# Patient Record
Sex: FEMALE | Race: OTHER | NOT HISPANIC OR LATINO
[De-identification: names, ages, dates, MRNs, and addresses within clinical notes are randomized per-mention and may not be internally consistent; named-entity substitution may affect disease eponyms.]

---

## 2017-02-10 ENCOUNTER — OTHER (OUTPATIENT)
Age: 57
End: 2017-02-10

## 2017-02-14 ENCOUNTER — OTHER (OUTPATIENT)
Age: 57
End: 2017-02-14

## 2017-04-13 ENCOUNTER — RX RENEWAL (OUTPATIENT)
Age: 57
End: 2017-04-13

## 2017-04-18 ENCOUNTER — APPOINTMENT (OUTPATIENT)
Dept: INTERNAL MEDICINE | Facility: CLINIC | Age: 57
End: 2017-04-18

## 2017-04-18 ENCOUNTER — RX RENEWAL (OUTPATIENT)
Age: 57
End: 2017-04-18

## 2017-04-18 VITALS
HEART RATE: 80 BPM | DIASTOLIC BLOOD PRESSURE: 84 MMHG | HEIGHT: 60 IN | OXYGEN SATURATION: 98 % | BODY MASS INDEX: 33.77 KG/M2 | WEIGHT: 172 LBS | SYSTOLIC BLOOD PRESSURE: 132 MMHG | TEMPERATURE: 99.4 F

## 2017-04-18 DIAGNOSIS — Z87.891 PERSONAL HISTORY OF NICOTINE DEPENDENCE: ICD-10-CM

## 2017-04-18 DIAGNOSIS — Z87.09 PERSONAL HISTORY OF OTHER DISEASES OF THE RESPIRATORY SYSTEM: ICD-10-CM

## 2017-04-18 RX ORDER — MOMETASONE 50 UG/1
50 SPRAY, METERED NASAL TWICE DAILY
Qty: 1 | Refills: 2 | Status: DISCONTINUED | COMMUNITY
Start: 2017-04-18 | End: 2017-04-18

## 2017-04-26 ENCOUNTER — RX RENEWAL (OUTPATIENT)
Age: 57
End: 2017-04-26

## 2017-04-26 RX ORDER — LORATADINE 5 MG/5 ML
10-240 SOLUTION, ORAL ORAL
Qty: 1 | Refills: 0 | Status: DISCONTINUED | COMMUNITY
Start: 2017-04-18 | End: 2017-04-26

## 2017-04-27 ENCOUNTER — RX RENEWAL (OUTPATIENT)
Age: 57
End: 2017-04-27

## 2017-05-08 ENCOUNTER — RX RENEWAL (OUTPATIENT)
Age: 57
End: 2017-05-08

## 2017-05-22 ENCOUNTER — RX RENEWAL (OUTPATIENT)
Age: 57
End: 2017-05-22

## 2017-06-12 ENCOUNTER — RX RENEWAL (OUTPATIENT)
Age: 57
End: 2017-06-12

## 2017-06-27 ENCOUNTER — RX RENEWAL (OUTPATIENT)
Age: 57
End: 2017-06-27

## 2017-08-29 ENCOUNTER — RX RENEWAL (OUTPATIENT)
Age: 57
End: 2017-08-29

## 2017-12-16 ENCOUNTER — RESULT REVIEW (OUTPATIENT)
Age: 57
End: 2017-12-16

## 2018-01-24 ENCOUNTER — RX RENEWAL (OUTPATIENT)
Age: 58
End: 2018-01-24

## 2018-02-15 ENCOUNTER — TRANSCRIPTION ENCOUNTER (OUTPATIENT)
Age: 58
End: 2018-02-15

## 2018-02-15 ENCOUNTER — APPOINTMENT (OUTPATIENT)
Dept: INTERNAL MEDICINE | Facility: CLINIC | Age: 58
End: 2018-02-15

## 2018-02-27 ENCOUNTER — APPOINTMENT (OUTPATIENT)
Dept: ORTHOPEDIC SURGERY | Facility: CLINIC | Age: 58
End: 2018-02-27
Payer: MEDICAID

## 2018-02-27 PROCEDURE — 99214 OFFICE O/P EST MOD 30 MIN: CPT

## 2018-10-08 ENCOUNTER — RX RENEWAL (OUTPATIENT)
Age: 58
End: 2018-10-08

## 2019-02-01 ENCOUNTER — APPOINTMENT (OUTPATIENT)
Dept: INTERNAL MEDICINE | Facility: CLINIC | Age: 59
End: 2019-02-01
Payer: COMMERCIAL

## 2019-02-01 ENCOUNTER — LABORATORY RESULT (OUTPATIENT)
Age: 59
End: 2019-02-01

## 2019-02-01 ENCOUNTER — TRANSCRIPTION ENCOUNTER (OUTPATIENT)
Age: 59
End: 2019-02-01

## 2019-02-01 VITALS
WEIGHT: 166 LBS | DIASTOLIC BLOOD PRESSURE: 82 MMHG | HEART RATE: 82 BPM | OXYGEN SATURATION: 98 % | SYSTOLIC BLOOD PRESSURE: 136 MMHG | TEMPERATURE: 98.5 F | BODY MASS INDEX: 32.42 KG/M2

## 2019-02-01 PROCEDURE — G0008: CPT

## 2019-02-01 PROCEDURE — 99396 PREV VISIT EST AGE 40-64: CPT | Mod: 25

## 2019-02-01 PROCEDURE — 90686 IIV4 VACC NO PRSV 0.5 ML IM: CPT

## 2019-02-01 PROCEDURE — 36415 COLL VENOUS BLD VENIPUNCTURE: CPT

## 2019-02-01 RX ORDER — CHLORHEXIDINE GLUCONATE 4 %
325 (65 FE) LIQUID (ML) TOPICAL
Qty: 90 | Refills: 0 | Status: DISCONTINUED | COMMUNITY
Start: 2017-04-13 | End: 2019-02-01

## 2019-02-01 RX ORDER — LORATADINE 10 MG/1
10 TABLET ORAL
Qty: 60 | Refills: 1 | Status: DISCONTINUED | COMMUNITY
Start: 2017-06-27 | End: 2019-02-01

## 2019-02-01 RX ORDER — FLUTICASONE PROPIONATE 50 UG/1
50 SPRAY, METERED NASAL
Qty: 1 | Refills: 0 | Status: DISCONTINUED | COMMUNITY
Start: 2017-04-18 | End: 2019-02-01

## 2019-02-01 RX ORDER — BENZONATATE 100 MG/1
100 CAPSULE ORAL
Qty: 1 | Refills: 0 | Status: DISCONTINUED | COMMUNITY
Start: 2017-04-18 | End: 2019-02-01

## 2019-02-01 NOTE — ASSESSMENT
[FreeTextEntry1] : 57 y/o female with a hx of idiopthic neutropenia, here for CPE, largely healty\par \par GERD:  advised trial of decrease in PPI dosing/frequency, can move to H2 blocker every day, discussed reisksof long term ppi use.\par \par Neutropenia:  check CBC, despite nuimbers, patient has not demonstrated clinical susceptibility to infection\par -flu shot today\par \par HCM:  flu shot today\par mammo ordered\par reports recent Pap in a few years\par Hep C screening\par \par ABd pain: normal exam, symptoms seem most c/w gas, advised healthy eating\par Hip girdle back pain with prolonged sitting, likely MSK, suggested frequent stretching for now.

## 2019-02-01 NOTE — HISTORY OF PRESENT ILLNESS
[FreeTextEntry1] : CPE [de-identified] : Has been getting shots for plantar fascitis.  \par \par Has some RUQ pain, worse with poor eating, worse with constipation.  \par \par Driving for [employer], sits for long stretches.  Develops a burnign pain in her hip girdle, lower back, which is relieved with standing and stretching.  No association with bowels, improves when she eliminates carbs.  \par \par C/o "pink eye" on left.  spray painted a window on Sunday, was wearing glasses.  by tuesday, eye was burning.  some blurriness had oain in eye, not worse with movement.  no drainage, no crusting.  symptoms largely unchanged.  took tylenol with relief (3 tabs), and saline drops with some relief.\par \par Finger joints are great.  knees still bother her.  sometimes takes an NSAID< which helps.  \par \par Feeling well otherwise, no infections.  \par \par Due for mammo. declines flu shot.

## 2019-02-01 NOTE — PHYSICAL EXAM
[No Acute Distress] : no acute distress [Well-Appearing] : well-appearing [Normal Sclera/Conjunctiva] : normal sclera/conjunctiva [Normal Oropharynx] : the oropharynx was normal [Normal TMs] : both tympanic membranes were normal [Supple] : supple [No Lymphadenopathy] : no lymphadenopathy [No Respiratory Distress] : no respiratory distress  [Clear to Auscultation] : lungs were clear to auscultation bilaterally [Normal Rate] : normal rate  [Regular Rhythm] : with a regular rhythm [Normal S1, S2] : normal S1 and S2 [No Murmur] : no murmur heard [No Edema] : there was no peripheral edema [Normal Appearance] : normal in appearance [No Nipple Discharge] : no nipple discharge [Soft] : abdomen soft [Non Tender] : non-tender [No Masses] : no abdominal mass palpated [No HSM] : no HSM [Normal Supraclavicular Nodes] : no supraclavicular lymphadenopathy [Normal Axillary Nodes] : no axillary lymphadenopathy [Normal Posterior Cervical Nodes] : no posterior cervical lymphadenopathy [Normal Anterior Cervical Nodes] : no anterior cervical lymphadenopathy [No Rash] : no rash [No Skin Lesions] : no skin lesions [Normal Mood] : the mood was normal [Normal Insight/Judgement] : insight and judgment were intact

## 2019-02-04 LAB
ALBUMIN SERPL ELPH-MCNC: 4.7 G/DL
ALP BLD-CCNC: 74 U/L
ALT SERPL-CCNC: 14 U/L
ANION GAP SERPL CALC-SCNC: 13 MMOL/L
AST SERPL-CCNC: 17 U/L
BASOPHILS # BLD AUTO: 0.07 K/UL
BASOPHILS NFR BLD AUTO: 2.6 %
BILIRUB SERPL-MCNC: 0.4 MG/DL
BUN SERPL-MCNC: 9 MG/DL
CALCIUM SERPL-MCNC: 10 MG/DL
CHLORIDE SERPL-SCNC: 103 MMOL/L
CHOLEST SERPL-MCNC: 187 MG/DL
CHOLEST/HDLC SERPL: 3 RATIO
CO2 SERPL-SCNC: 26 MMOL/L
CREAT SERPL-MCNC: 0.64 MG/DL
EOSINOPHIL # BLD AUTO: 0.11 K/UL
EOSINOPHIL NFR BLD AUTO: 4.4 %
GLUCOSE SERPL-MCNC: 89 MG/DL
HBA1C MFR BLD HPLC: 4.7 %
HCT VFR BLD CALC: 43.6 %
HCV AB SER QL: NONREACTIVE
HCV S/CO RATIO: 0.15 S/CO
HDLC SERPL-MCNC: 62 MG/DL
HGB BLD-MCNC: 14.6 G/DL
LDLC SERPL CALC-MCNC: 104 MG/DL
LYMPHOCYTES # BLD AUTO: 0.8 K/UL
LYMPHOCYTES NFR BLD AUTO: 31.6 %
MAN DIFF?: NORMAL
MCHC RBC-ENTMCNC: 29.6 PG
MCHC RBC-ENTMCNC: 33.5 GM/DL
MCV RBC AUTO: 88.4 FL
MONOCYTES # BLD AUTO: 0.35 K/UL
MONOCYTES NFR BLD AUTO: 14 %
NEUTROPHILS # BLD AUTO: 1.04 K/UL
NEUTROPHILS NFR BLD AUTO: 33.4 %
PLATELET # BLD AUTO: 317 K/UL
POTASSIUM SERPL-SCNC: 4.3 MMOL/L
PROT SERPL-MCNC: 7.7 G/DL
RBC # BLD: 4.93 M/UL
RBC # FLD: 13.7 %
SODIUM SERPL-SCNC: 142 MMOL/L
TRIGL SERPL-MCNC: 107 MG/DL
WBC # FLD AUTO: 2.52 K/UL

## 2019-03-04 ENCOUNTER — RX RENEWAL (OUTPATIENT)
Age: 59
End: 2019-03-04

## 2019-03-19 ENCOUNTER — OTHER (OUTPATIENT)
Age: 59
End: 2019-03-19

## 2019-05-08 ENCOUNTER — APPOINTMENT (OUTPATIENT)
Dept: INTERNAL MEDICINE | Facility: CLINIC | Age: 59
End: 2019-05-08

## 2019-05-10 ENCOUNTER — RESULT REVIEW (OUTPATIENT)
Age: 59
End: 2019-05-10

## 2019-05-13 ENCOUNTER — APPOINTMENT (OUTPATIENT)
Dept: INTERNAL MEDICINE | Facility: CLINIC | Age: 59
End: 2019-05-13
Payer: COMMERCIAL

## 2019-05-13 VITALS
HEART RATE: 80 BPM | TEMPERATURE: 98.3 F | BODY MASS INDEX: 30.74 KG/M2 | SYSTOLIC BLOOD PRESSURE: 130 MMHG | HEIGHT: 60 IN | OXYGEN SATURATION: 98 % | DIASTOLIC BLOOD PRESSURE: 76 MMHG | WEIGHT: 156.6 LBS

## 2019-05-13 DIAGNOSIS — R21 RASH AND OTHER NONSPECIFIC SKIN ERUPTION: ICD-10-CM

## 2019-05-13 PROCEDURE — 99214 OFFICE O/P EST MOD 30 MIN: CPT

## 2019-05-13 NOTE — REVIEW OF SYSTEMS
[Recent Change In Weight] : ~T recent weight change [Constipation] : constipation [Nausea] : no nausea [Abdominal Pain] : abdominal pain [Diarrhea] : diarrhea [Vomiting] : no vomiting [Heartburn] : no heartburn [Melena] : no melena [Negative] : Musculoskeletal [FreeTextEntry2] : lost 10lbs with effort

## 2019-05-13 NOTE — ASSESSMENT
[FreeTextEntry1] : Lower abdominal pain/lower back pain, occurs while driving for prolonger periods, resolves with standing/walking and with bowel movements.  Has improved with improvement in her chronic constipation.  Suspect gas pains. exacerbated by prolonged sitting (uber ), given resolution.  will monitor for now (especially while she is vacationing and won't be driving).  If symptoms have not resolved, will pursue imaging,\par \par Given hx of neutropenia, patient provided with a rx for levaquin for travel.

## 2019-05-13 NOTE — HISTORY OF PRESENT ILLNESS
[de-identified] : Lower back pain and lower abdominal pain, burning/inflammation in nature, worse with constipation, does not hurt to touch, pain goes away with movement, resolves when she moves her bowels.  Happens everyday, usually occurs after driving for a while, hurts the most while driving. goes away at the end of the day when she stops driving. and during sleep, but it will recur the next day if she starts driving again.  Pain is severe enough to give her a headache.  Started using prunes (this week) and senekot (2 weeks ago) to help with her bowels.  Going to the bathroom and now it is not little rocks (has a hx of chronic constipation). Improvement in BM has decreased her pain.  Can also occur after meals, feels uncomfortable.  \par \par No fever, no n/v/d, no urinary symptoms, pain does not radiate, she does not have weekness, numbness or tingling in her legs. \par \par Going to [place] for 7 weeks.

## 2019-05-13 NOTE — PHYSICAL EXAM
[No Acute Distress] : no acute distress [Well-Appearing] : well-appearing [Normal Sclera/Conjunctiva] : normal sclera/conjunctiva [Normal Oropharynx] : the oropharynx was normal [Supple] : supple [No Respiratory Distress] : no respiratory distress  [Clear to Auscultation] : lungs were clear to auscultation bilaterally [Normal Rate] : normal rate  [Regular Rhythm] : with a regular rhythm [Normal S1, S2] : normal S1 and S2 [No Edema] : there was no peripheral edema [Soft] : abdomen soft [Non Tender] : non-tender [Normal Bowel Sounds] : normal bowel sounds [No Masses] : no abdominal mass palpated [Normal Anterior Cervical Nodes] : no anterior cervical lymphadenopathy [Normal Supraclavicular Nodes] : no supraclavicular lymphadenopathy

## 2019-06-04 ENCOUNTER — TRANSCRIPTION ENCOUNTER (OUTPATIENT)
Age: 59
End: 2019-06-04

## 2019-08-01 ENCOUNTER — TRANSCRIPTION ENCOUNTER (OUTPATIENT)
Age: 59
End: 2019-08-01

## 2019-08-01 ENCOUNTER — OTHER (OUTPATIENT)
Age: 59
End: 2019-08-01

## 2019-08-02 ENCOUNTER — OUTPATIENT (OUTPATIENT)
Dept: OUTPATIENT SERVICES | Facility: HOSPITAL | Age: 59
LOS: 1 days | Discharge: HOME | End: 2019-08-02

## 2019-08-02 DIAGNOSIS — D72.819 DECREASED WHITE BLOOD CELL COUNT, UNSPECIFIED: ICD-10-CM

## 2019-08-05 ENCOUNTER — RESULT REVIEW (OUTPATIENT)
Age: 59
End: 2019-08-05

## 2019-08-05 ENCOUNTER — APPOINTMENT (OUTPATIENT)
Dept: HEMATOLOGY ONCOLOGY | Facility: CLINIC | Age: 59
End: 2019-08-05
Payer: COMMERCIAL

## 2019-08-05 ENCOUNTER — APPOINTMENT (OUTPATIENT)
Dept: INFUSION THERAPY | Facility: HOSPITAL | Age: 59
End: 2019-08-05

## 2019-08-05 VITALS
WEIGHT: 161.69 LBS | RESPIRATION RATE: 18 BRPM | BODY MASS INDEX: 31.58 KG/M2 | DIASTOLIC BLOOD PRESSURE: 84 MMHG | OXYGEN SATURATION: 100 % | TEMPERATURE: 98.9 F | SYSTOLIC BLOOD PRESSURE: 126 MMHG | HEART RATE: 68 BPM

## 2019-08-05 LAB
EOSINOPHIL NFR BLD AUTO: 7 % — HIGH (ref 0–6)
HCT VFR BLD CALC: 41.3 % — SIGNIFICANT CHANGE UP (ref 34.5–45)
HGB BLD-MCNC: 14.2 G/DL — SIGNIFICANT CHANGE UP (ref 11.5–15.5)
LYMPHOCYTES # BLD AUTO: 46 % — HIGH (ref 13–44)
LYMPHOCYTES # BLD AUTO: SIGNIFICANT CHANGE UP K/UL (ref 1–3.3)
MCHC RBC-ENTMCNC: 30.8 PG — SIGNIFICANT CHANGE UP (ref 27–34)
MCHC RBC-ENTMCNC: 34.5 G/DL — SIGNIFICANT CHANGE UP (ref 32–36)
MCV RBC AUTO: 89.2 FL — SIGNIFICANT CHANGE UP (ref 80–100)
MONOCYTES NFR BLD AUTO: 14 % — SIGNIFICANT CHANGE UP (ref 2–14)
NEUTROPHILS # BLD AUTO: 0.4 K/UL — LOW (ref 1.8–7.4)
NEUTROPHILS NFR BLD AUTO: 33 % — LOW (ref 43–77)
PLAT MORPH BLD: NORMAL — SIGNIFICANT CHANGE UP
PLATELET # BLD AUTO: 328 K/UL — SIGNIFICANT CHANGE UP (ref 150–400)
RBC # BLD: 4.63 M/UL — SIGNIFICANT CHANGE UP (ref 3.8–5.2)
RBC # FLD: 12.7 % — SIGNIFICANT CHANGE UP (ref 10.3–14.5)
RBC BLD AUTO: SIGNIFICANT CHANGE UP
WBC # BLD: 2.1 K/UL — LOW (ref 3.8–10.5)
WBC # FLD AUTO: 2.1 K/UL — LOW (ref 3.8–10.5)

## 2019-08-05 PROCEDURE — 99214 OFFICE O/P EST MOD 30 MIN: CPT

## 2019-08-05 RX ORDER — LEVOFLOXACIN 500 MG/1
500 TABLET, FILM COATED ORAL DAILY
Qty: 14 | Refills: 0 | Status: DISCONTINUED | COMMUNITY
Start: 2019-05-13 | End: 2019-08-05

## 2019-08-06 ENCOUNTER — RESULT REVIEW (OUTPATIENT)
Age: 59
End: 2019-08-06

## 2019-08-06 ENCOUNTER — APPOINTMENT (OUTPATIENT)
Dept: INFUSION THERAPY | Facility: HOSPITAL | Age: 59
End: 2019-08-06

## 2019-08-06 DIAGNOSIS — Z51.89 ENCOUNTER FOR OTHER SPECIFIED AFTERCARE: ICD-10-CM

## 2019-08-06 LAB
BASOPHILS # BLD AUTO: 0.1 K/UL — SIGNIFICANT CHANGE UP (ref 0–0.2)
BASOPHILS NFR BLD AUTO: 1 % — SIGNIFICANT CHANGE UP (ref 0–2)
EOSINOPHIL # BLD AUTO: 0.3 K/UL — SIGNIFICANT CHANGE UP (ref 0–0.5)
EOSINOPHIL NFR BLD AUTO: 3.8 % — SIGNIFICANT CHANGE UP (ref 0–6)
HCT VFR BLD CALC: 42 % — SIGNIFICANT CHANGE UP (ref 34.5–45)
HGB BLD-MCNC: 14.7 G/DL — SIGNIFICANT CHANGE UP (ref 11.5–15.5)
LYMPHOCYTES # BLD AUTO: 1 K/UL — SIGNIFICANT CHANGE UP (ref 1–3.3)
LYMPHOCYTES # BLD AUTO: 14.9 % — SIGNIFICANT CHANGE UP (ref 13–44)
MCHC RBC-ENTMCNC: 31.3 PG — SIGNIFICANT CHANGE UP (ref 27–34)
MCHC RBC-ENTMCNC: 35.1 G/DL — SIGNIFICANT CHANGE UP (ref 32–36)
MCV RBC AUTO: 89.2 FL — SIGNIFICANT CHANGE UP (ref 80–100)
MONOCYTES # BLD AUTO: 0.8 K/UL — SIGNIFICANT CHANGE UP (ref 0–0.9)
MONOCYTES NFR BLD AUTO: 12.4 % — SIGNIFICANT CHANGE UP (ref 2–14)
NEUTROPHILS # BLD AUTO: 4.5 K/UL — SIGNIFICANT CHANGE UP (ref 1.8–7.4)
NEUTROPHILS NFR BLD AUTO: 67.9 % — SIGNIFICANT CHANGE UP (ref 43–77)
PLATELET # BLD AUTO: 271 K/UL — SIGNIFICANT CHANGE UP (ref 150–400)
RBC # BLD: 4.71 M/UL — SIGNIFICANT CHANGE UP (ref 3.8–5.2)
RBC # FLD: 12.2 % — SIGNIFICANT CHANGE UP (ref 10.3–14.5)
WBC # BLD: 6.7 K/UL — SIGNIFICANT CHANGE UP (ref 3.8–10.5)
WBC # FLD AUTO: 6.7 K/UL — SIGNIFICANT CHANGE UP (ref 3.8–10.5)

## 2019-08-08 NOTE — ASSESSMENT
[FreeTextEntry1] : 56 year old woman with idiopathic neutropenia.  She has had neutropenia for many years, followed by multiple hematologists in the past.  \par She has had a bone marrow biopsy in Feb 2015 that was unrevealing, normal myeloid lineage.  She has no significant signs or features of a congenital neutropenia or chronic infections. \par Her viral studies- HIV/hepatitis are negative, normal copper/zinc levels, negative rheumatologic panel except for a minimally high GUMARO, normal immunoglobulins, TSH.\par Her neutropenia is likely autoimmune/idiopathic.  \par As she does not have increased amount of infections or fevers, would not offer any g-csf or prophylactic antibiotics- risks and benefits discussed.\par  If she has any fever, she is instructed that she needs to be seen.  Prior to any procedure/surgery, would offer her g-csf to minimize any infectious complication/healing. \par Pt is having surgery for bilateral bunions on 8/7/19.\par  today,, pt will get Zarxio today and tomorrow in preparation for surgery.\par She will follow up as needed.\par Follow up in 6 months.

## 2019-08-08 NOTE — HISTORY OF PRESENT ILLNESS
[de-identified] : Patient is here for a follow up after having her bloods checked at her PCP's office, noted to have a WBC count of 1.8.  She is feeling well, no significant complaints, she has no fever/chills, no recent infections, no chest pain, no SOB, no abdominal c/o.  She has had intermittent hot flashes/sweats due to menopause.  \par She was recently evaluated by rheumatology for joint aches, diagnosed with osteoarthritis and was started on Mobic.  Most of her discomfort is in her 3rd, 4th and 5th fingers.\par \par Pt is going for bunion surgery on both feet on 8/7/19.      [de-identified] : Idiopathic leukopenia/neutropenia

## 2019-08-08 NOTE — PHYSICAL EXAM
[Fully active, able to carry on all pre-disease performance without restriction] : Status 0 - Fully active, able to carry on all pre-disease performance without restriction [de-identified] : No cervical, axillary or inguinal LAD noted.  [Normal] : affect appropriate

## 2019-08-22 ENCOUNTER — APPOINTMENT (OUTPATIENT)
Dept: HEMATOLOGY ONCOLOGY | Facility: CLINIC | Age: 59
End: 2019-08-22

## 2019-08-23 ENCOUNTER — APPOINTMENT (OUTPATIENT)
Dept: HEMATOLOGY ONCOLOGY | Facility: CLINIC | Age: 59
End: 2019-08-23

## 2019-08-23 ENCOUNTER — RESULT REVIEW (OUTPATIENT)
Age: 59
End: 2019-08-23

## 2019-08-23 LAB
EOSINOPHIL # BLD AUTO: 0.2 K/UL — SIGNIFICANT CHANGE UP (ref 0–0.5)
EOSINOPHIL NFR BLD AUTO: 4 % — SIGNIFICANT CHANGE UP (ref 0–6)
HCT VFR BLD CALC: 42.6 % — SIGNIFICANT CHANGE UP (ref 34.5–45)
HGB BLD-MCNC: 14.7 G/DL — SIGNIFICANT CHANGE UP (ref 11.5–15.5)
LYMPHOCYTES # BLD AUTO: 0.9 K/UL — LOW (ref 1–3.3)
LYMPHOCYTES # BLD AUTO: 59 % — HIGH (ref 13–44)
MCHC RBC-ENTMCNC: 30.7 PG — SIGNIFICANT CHANGE UP (ref 27–34)
MCHC RBC-ENTMCNC: 34.6 G/DL — SIGNIFICANT CHANGE UP (ref 32–36)
MCV RBC AUTO: 88.8 FL — SIGNIFICANT CHANGE UP (ref 80–100)
MONOCYTES # BLD AUTO: 0.4 K/UL — SIGNIFICANT CHANGE UP (ref 0–0.9)
MONOCYTES NFR BLD AUTO: 16 % — HIGH (ref 2–14)
NEUTROPHILS # BLD AUTO: 0.3 K/UL — LOW (ref 1.8–7.4)
NEUTROPHILS NFR BLD AUTO: 21 % — LOW (ref 43–77)
PLAT MORPH BLD: NORMAL — SIGNIFICANT CHANGE UP
PLATELET # BLD AUTO: 377 K/UL — SIGNIFICANT CHANGE UP (ref 150–400)
RBC # BLD: 4.79 M/UL — SIGNIFICANT CHANGE UP (ref 3.8–5.2)
RBC # FLD: 12.9 % — SIGNIFICANT CHANGE UP (ref 10.3–14.5)
RBC BLD AUTO: SIGNIFICANT CHANGE UP
WBC # BLD: 1.8 K/UL — LOW (ref 3.8–10.5)
WBC # FLD AUTO: 1.8 K/UL — LOW (ref 3.8–10.5)

## 2019-09-05 ENCOUNTER — TRANSCRIPTION ENCOUNTER (OUTPATIENT)
Age: 59
End: 2019-09-05

## 2020-01-27 ENCOUNTER — RX RENEWAL (OUTPATIENT)
Age: 60
End: 2020-01-27

## 2020-04-06 ENCOUNTER — APPOINTMENT (OUTPATIENT)
Dept: INTERNAL MEDICINE | Facility: CLINIC | Age: 60
End: 2020-04-06

## 2020-04-20 ENCOUNTER — RECORD ABSTRACTING (OUTPATIENT)
Age: 60
End: 2020-04-20

## 2020-07-13 ENCOUNTER — RX RENEWAL (OUTPATIENT)
Age: 60
End: 2020-07-13

## 2020-10-12 ENCOUNTER — LABORATORY RESULT (OUTPATIENT)
Age: 60
End: 2020-10-12

## 2020-10-12 ENCOUNTER — APPOINTMENT (OUTPATIENT)
Dept: INTERNAL MEDICINE | Facility: CLINIC | Age: 60
End: 2020-10-12
Payer: COMMERCIAL

## 2020-10-12 VITALS
SYSTOLIC BLOOD PRESSURE: 130 MMHG | TEMPERATURE: 97.5 F | OXYGEN SATURATION: 98 % | WEIGHT: 168.5 LBS | DIASTOLIC BLOOD PRESSURE: 80 MMHG | HEIGHT: 60 IN | BODY MASS INDEX: 33.08 KG/M2 | HEART RATE: 83 BPM

## 2020-10-12 DIAGNOSIS — S80.02XA CONTUSION OF LEFT KNEE, INITIAL ENCOUNTER: ICD-10-CM

## 2020-10-12 DIAGNOSIS — G89.29 PAIN IN UNSPECIFIED KNEE: ICD-10-CM

## 2020-10-12 DIAGNOSIS — M25.569 PAIN IN UNSPECIFIED KNEE: ICD-10-CM

## 2020-10-12 DIAGNOSIS — M19.049 PRIMARY OSTEOARTHRITIS, UNSPECIFIED HAND: ICD-10-CM

## 2020-10-12 DIAGNOSIS — Z87.42 PERSONAL HISTORY OF OTHER DISEASES OF THE FEMALE GENITAL TRACT: ICD-10-CM

## 2020-10-12 DIAGNOSIS — Z86.2 PERSONAL HISTORY OF DISEASES OF THE BLOOD AND BLOOD-FORMING ORGANS AND CERTAIN DISORDERS INVOLVING THE IMMUNE MECHANISM: ICD-10-CM

## 2020-10-12 DIAGNOSIS — S80.01XA CONTUSION OF RIGHT KNEE, INITIAL ENCOUNTER: ICD-10-CM

## 2020-10-12 PROCEDURE — 36415 COLL VENOUS BLD VENIPUNCTURE: CPT

## 2020-10-12 PROCEDURE — 99396 PREV VISIT EST AGE 40-64: CPT | Mod: 25

## 2020-10-12 NOTE — PHYSICAL EXAM
[No Acute Distress] : no acute distress [Well-Appearing] : well-appearing [Normal Sclera/Conjunctiva] : normal sclera/conjunctiva [Normal Outer Ear/Nose] : the outer ears and nose were normal in appearance [No Lymphadenopathy] : no lymphadenopathy [No Respiratory Distress] : no respiratory distress  [Clear to Auscultation] : lungs were clear to auscultation bilaterally [Normal Rate] : normal rate  [Regular Rhythm] : with a regular rhythm [Normal S1, S2] : normal S1 and S2 [No Edema] : there was no peripheral edema [Soft] : abdomen soft [Non Tender] : non-tender [Normal Supraclavicular Nodes] : no supraclavicular lymphadenopathy [Normal Anterior Cervical Nodes] : no anterior cervical lymphadenopathy [No Rash] : no rash [de-identified] : R TM normal, L TM with some injection, no drainage, no pain at pinna

## 2020-10-12 NOTE — ASSESSMENT
[FreeTextEntry1] : 59 y/o female for f/u\par \par Neutropenia:  CHeck CBC today\par \par GERD: PPI\par \par OA: mobic prn\par \par left ear fullness: advised neti pot + flonase, requests ENT eval\par \par HCM:\par FLu shot done\par DUe for mammo\par Due for colonoscopy\par Labs ordered\par \par COVID AB testing

## 2020-10-12 NOTE — HISTORY OF PRESENT ILLNESS
[FreeTextEntry1] : CPE [de-identified] : 61 y/o female here for f/u, last visit here May 2019.  hx of neutropenia without increased infections, not requiring treatment\par \par HCM:\par Flu shot - had 2 weeks ago.\par Breast Cancer 16 Feb 2017 *s Mammography   OVERDUE\par Colon Cancer 16 Jan 2015 Colonoscopy Screening: No malignant lesions REPEAT DUE\par Last labs 2019\par \par Notes some pressuers in her left ear, no tinnitus or change in hearing, has tried sudafed with no improvement, has used flonase or allergy tablets intermittently.  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

## 2020-10-13 LAB
ALBUMIN SERPL ELPH-MCNC: 4.8 G/DL
ALP BLD-CCNC: 106 U/L
ALT SERPL-CCNC: 25 U/L
ANION GAP SERPL CALC-SCNC: 12 MMOL/L
AST SERPL-CCNC: 20 U/L
BASOPHILS # BLD AUTO: 0.05 K/UL
BASOPHILS NFR BLD AUTO: 1.8 %
BILIRUB SERPL-MCNC: 0.7 MG/DL
BUN SERPL-MCNC: 16 MG/DL
CALCIUM SERPL-MCNC: 10.1 MG/DL
CHLORIDE SERPL-SCNC: 104 MMOL/L
CHOLEST SERPL-MCNC: 215 MG/DL
CHOLEST/HDLC SERPL: 3 RATIO
CO2 SERPL-SCNC: 26 MMOL/L
CREAT SERPL-MCNC: 0.6 MG/DL
EOSINOPHIL # BLD AUTO: 0.14 K/UL
EOSINOPHIL NFR BLD AUTO: 5.4 %
ESTIMATED AVERAGE GLUCOSE: 100 MG/DL
GLUCOSE SERPL-MCNC: 90 MG/DL
HBA1C MFR BLD HPLC: 5.1 %
HCT VFR BLD CALC: 42.4 %
HDLC SERPL-MCNC: 72 MG/DL
HGB BLD-MCNC: 13.9 G/DL
LDLC SERPL CALC-MCNC: 124 MG/DL
LYMPHOCYTES # BLD AUTO: 1.35 K/UL
LYMPHOCYTES NFR BLD AUTO: 53.2 %
MAN DIFF?: NORMAL
MCHC RBC-ENTMCNC: 29 PG
MCHC RBC-ENTMCNC: 32.8 GM/DL
MCV RBC AUTO: 88.3 FL
MONOCYTES # BLD AUTO: 0.3 K/UL
MONOCYTES NFR BLD AUTO: 11.7 %
NEUTROPHILS # BLD AUTO: 0.59 K/UL
NEUTROPHILS NFR BLD AUTO: 18 %
PLATELET # BLD AUTO: 350 K/UL
POTASSIUM SERPL-SCNC: 4.3 MMOL/L
PROT SERPL-MCNC: 7.9 G/DL
RBC # BLD: 4.8 M/UL
RBC # FLD: 13.3 %
SARS-COV-2 IGG SERPL IA-ACNC: 0.1 INDEX
SARS-COV-2 IGG SERPL QL IA: NEGATIVE
SODIUM SERPL-SCNC: 141 MMOL/L
TRIGL SERPL-MCNC: 95 MG/DL
TSH SERPL-ACNC: 3.02 UIU/ML
WBC # FLD AUTO: 2.54 K/UL

## 2020-11-11 ENCOUNTER — APPOINTMENT (OUTPATIENT)
Dept: GASTROENTEROLOGY | Facility: CLINIC | Age: 60
End: 2020-11-11
Payer: COMMERCIAL

## 2020-11-11 VITALS
BODY MASS INDEX: 32.98 KG/M2 | SYSTOLIC BLOOD PRESSURE: 148 MMHG | HEART RATE: 88 BPM | HEIGHT: 60 IN | DIASTOLIC BLOOD PRESSURE: 90 MMHG | TEMPERATURE: 97.2 F | WEIGHT: 168 LBS

## 2020-11-11 PROCEDURE — 99072 ADDL SUPL MATRL&STAF TM PHE: CPT

## 2020-11-11 PROCEDURE — 99204 OFFICE O/P NEW MOD 45 MIN: CPT

## 2020-11-11 RX ORDER — HYDROCORTISONE 25 MG/G
2.5 CREAM TOPICAL TWICE DAILY
Qty: 1 | Refills: 0 | Status: DISCONTINUED | COMMUNITY
Start: 2019-05-13 | End: 2020-11-11

## 2020-11-11 RX ORDER — OMEPRAZOLE 20 MG/1
20 CAPSULE, DELAYED RELEASE ORAL DAILY
Qty: 90 | Refills: 3 | Status: DISCONTINUED | COMMUNITY
Start: 2019-03-19 | End: 2020-11-11

## 2020-11-11 NOTE — HISTORY OF PRESENT ILLNESS
[Heartburn] : heartburn worsened [Nausea] : denies nausea [Vomiting] : denies vomiting [Diarrhea] : denies diarrhea [Constipation] : constipation worsened [Yellow Skin Or Eyes (Jaundice)] : denies jaundice [Abdominal Pain] : abdominal pain worsened [Abdominal Swelling] : denies abdominal swelling [Rectal Pain] : denies rectal pain [_________] : Performed [unfilled] [de-identified] : Megan presents to the office today for evaluation of heartburn, constipation, and lower abdominal pain.  She was last seen by Dr. Parrish in 2016.\par \par The patient reports chronic heartburn and has been taking pantoprazole 40 mg for more than 10 years.  In the past, she has used it twice a day.  Recently, she reports worsening reflux  over the past 6 months.  She denies any dietary or medication changes.  She experiences breakthrough heartburn after eating certain meals.  She denies any dysphagia or nausea.\par \par The patient also notes a change in her bowel movements and reports that she has been having increasing constipation.  She moves her bowels daily, but she sometimes experiences stretches of scybalous stools.  When she is constipated, she also notes bilateral lower abdominal tenderness.  She denies any straining or blood in the stool.  Her weight has been stable.  \par \par On her EGD in 2015, reflux esophagitis and fundic polyps were seen.  On her colonoscopy in 2015, only hemorrhoids were seen. [de-identified] : reflux esophagitis, fundic polyps [de-identified] : hemorrhoids

## 2020-11-11 NOTE — REVIEW OF SYSTEMS
[Abdominal Pain] : abdominal pain [Constipation] : constipation [Joint Pain] : joint pain [Skin Lesions] : skin lesion [Negative] : Heme/Lymph [FreeTextEntry7] : lower abdominal pain [FreeTextEntry9] : generalized [de-identified] : left anterior wrist/ burn self cooking

## 2020-11-11 NOTE — REASON FOR VISIT
[Consultation] : a consultation visit [FreeTextEntry1] : heartburn, constipation, lower abdominal pain

## 2020-11-11 NOTE — ASSESSMENT
[FreeTextEntry1] : 1.  Worsening GERD on PPI.  EGD in January 2015 with reflux esophagitis and fundic polyps.\par 2.  Constipation with lower abdominal pain.  Colonoscopy in January 2015 with internal hemorrhoids.\par 3.  Obesity.\par 4.  History of iron deficiency anemia- secondary to menorrhagia.\par \par Recs:\par - Recent labs reviewed.\par - Prior EGD and colonoscopy results reviewed.\par - Patient was counseled on GERD lifestyle modifications including head of bed elevation at night, avoiding lying down 2-3 hours after eating, weight loss, avoiding tight-fitting clothing, eating small, frequent meals, and minimizing potential food triggers (caffeine, spicy foods, citrus foods, chocolate, mint, alcohol).\par - Continue PPI in AM.  Can use famotidine in evening for breakthrough heartburn.\par - The patient was advised to increase fluid and dietary fiber intake.  In addition, the patient was advised to use a fiber supplement daily and Miralax, Senna/Colace, or milk of magnesia as needed for severe constipation.\par - Patient was advised to undergo endoscopy and colonoscopy- procedure, risks, benefits, and alternatives were explained. Patient agreeable. Brochure given. Golytely prep.

## 2020-11-20 ENCOUNTER — APPOINTMENT (OUTPATIENT)
Dept: ULTRASOUND IMAGING | Facility: HOSPITAL | Age: 60
End: 2020-11-20
Payer: COMMERCIAL

## 2020-11-20 ENCOUNTER — APPOINTMENT (OUTPATIENT)
Dept: MAMMOGRAPHY | Facility: HOSPITAL | Age: 60
End: 2020-11-20
Payer: COMMERCIAL

## 2020-11-20 ENCOUNTER — OUTPATIENT (OUTPATIENT)
Dept: OUTPATIENT SERVICES | Facility: HOSPITAL | Age: 60
LOS: 1 days | End: 2020-11-20
Payer: COMMERCIAL

## 2020-11-20 DIAGNOSIS — Z00.8 ENCOUNTER FOR OTHER GENERAL EXAMINATION: ICD-10-CM

## 2020-11-20 PROCEDURE — 76641 ULTRASOUND BREAST COMPLETE: CPT | Mod: 26,50

## 2020-11-20 PROCEDURE — 77063 BREAST TOMOSYNTHESIS BI: CPT

## 2020-11-20 PROCEDURE — 77067 SCR MAMMO BI INCL CAD: CPT | Mod: 26

## 2020-11-20 PROCEDURE — 76641 ULTRASOUND BREAST COMPLETE: CPT

## 2020-11-20 PROCEDURE — 77063 BREAST TOMOSYNTHESIS BI: CPT | Mod: 26

## 2020-11-20 PROCEDURE — 77067 SCR MAMMO BI INCL CAD: CPT

## 2020-11-23 RX ORDER — POLYETHYLENE GLYCOL-3350 AND ELECTROLYTES WITH FLAVOR PACK 240; 5.84; 2.98; 6.72; 22.72 G/278.26G; G/278.26G; G/278.26G; G/278.26G; G/278.26G
240 POWDER, FOR SOLUTION ORAL
Qty: 1 | Refills: 0 | Status: DISCONTINUED | COMMUNITY
Start: 2020-11-12 | End: 2020-11-23

## 2020-12-01 ENCOUNTER — APPOINTMENT (OUTPATIENT)
Dept: DISASTER EMERGENCY | Facility: CLINIC | Age: 60
End: 2020-12-01

## 2020-12-02 LAB — SARS-COV-2 N GENE NPH QL NAA+PROBE: NOT DETECTED

## 2020-12-04 ENCOUNTER — APPOINTMENT (OUTPATIENT)
Dept: GASTROENTEROLOGY | Facility: CLINIC | Age: 60
End: 2020-12-04
Payer: COMMERCIAL

## 2020-12-04 ENCOUNTER — LABORATORY RESULT (OUTPATIENT)
Age: 60
End: 2020-12-04

## 2020-12-04 PROCEDURE — 43239 EGD BIOPSY SINGLE/MULTIPLE: CPT | Mod: 59

## 2020-12-04 PROCEDURE — 99072 ADDL SUPL MATRL&STAF TM PHE: CPT

## 2020-12-04 PROCEDURE — 45378 DIAGNOSTIC COLONOSCOPY: CPT

## 2020-12-15 PROBLEM — Z87.09 HISTORY OF ACUTE SINUSITIS: Status: RESOLVED | Noted: 2017-04-18 | Resolved: 2020-12-15

## 2021-02-22 ENCOUNTER — NON-APPOINTMENT (OUTPATIENT)
Age: 61
End: 2021-02-22

## 2021-09-08 ENCOUNTER — NON-APPOINTMENT (OUTPATIENT)
Age: 61
End: 2021-09-08

## 2021-09-09 ENCOUNTER — NON-APPOINTMENT (OUTPATIENT)
Age: 61
End: 2021-09-09

## 2022-05-19 ENCOUNTER — APPOINTMENT (OUTPATIENT)
Dept: INTERNAL MEDICINE | Facility: CLINIC | Age: 62
End: 2022-05-19
Payer: COMMERCIAL

## 2022-05-19 ENCOUNTER — LABORATORY RESULT (OUTPATIENT)
Age: 62
End: 2022-05-19

## 2022-05-19 ENCOUNTER — NON-APPOINTMENT (OUTPATIENT)
Age: 62
End: 2022-05-19

## 2022-05-19 VITALS
WEIGHT: 175 LBS | OXYGEN SATURATION: 98 % | BODY MASS INDEX: 34.36 KG/M2 | SYSTOLIC BLOOD PRESSURE: 136 MMHG | HEIGHT: 60 IN | DIASTOLIC BLOOD PRESSURE: 84 MMHG | TEMPERATURE: 98.2 F | HEART RATE: 79 BPM

## 2022-05-19 PROCEDURE — 36415 COLL VENOUS BLD VENIPUNCTURE: CPT

## 2022-05-19 PROCEDURE — 99396 PREV VISIT EST AGE 40-64: CPT | Mod: 25

## 2022-05-19 RX ORDER — MELOXICAM 15 MG/1
15 TABLET ORAL
Qty: 30 | Refills: 0 | Status: DISCONTINUED | COMMUNITY
Start: 2019-02-01 | End: 2022-05-19

## 2022-05-19 RX ORDER — ONDANSETRON 4 MG/1
4 TABLET ORAL
Qty: 3 | Refills: 0 | Status: DISCONTINUED | COMMUNITY
Start: 2020-11-11 | End: 2022-05-19

## 2022-05-19 RX ORDER — SODIUM SULFATE, POTASSIUM SULFATE, MAGNESIUM SULFATE 17.5; 3.13; 1.6 G/ML; G/ML; G/ML
17.5-3.13-1.6 SOLUTION, CONCENTRATE ORAL
Qty: 1 | Refills: 0 | Status: DISCONTINUED | COMMUNITY
Start: 2020-11-23 | End: 2022-05-19

## 2022-05-19 NOTE — HISTORY OF PRESENT ILLNESS
Pharmacy requests PA   [de-identified] : 63 y/o female for CPE\par \par Lsat seen Oct 2020\par \par Reports that she sometimes wakes up with heartburn\par WOrse after eating fried and acid foods\par continues pantoprazole daily, occ adds famotidine\par \par Also has lower abd burning sensation happens when she eats gluten and fried foods or if she is more constipated\par Feels better when she does't eat those things, has been eating healthier for two weeks and abd pain resolved and constipation is better\par \par \par Seeing a workman's comp\par Fell at work and hurt her knee and back, no fractures.

## 2022-05-19 NOTE — ASSESSMENT
[FreeTextEntry1] : 61 y/o female for CPE\par \par Neutropenia, chronic, asymptomatic:  CHeck CBC today\par Last heme visit 2019\par \par GERD: PPI + prn h2 blocker\par should avoid trigger foods\par -EGD 2020\par -gi f/u\par \par Constipation:  increase fiber, continue good hydratrion tactics\par \par HCM\par Metabolic screening ordered\par Mammo Nov 2020, mammo/sono ordered\par COVID  vaccines has had two, declines boosters, discussed risks\par GYN - over due, will schedule\par CRC screening: CLS Dec 2020 normal repeat 10 year

## 2022-05-24 LAB
ALBUMIN SERPL ELPH-MCNC: 4.7 G/DL
ALP BLD-CCNC: 98 U/L
ALT SERPL-CCNC: 14 U/L
ANION GAP SERPL CALC-SCNC: 11 MMOL/L
AST SERPL-CCNC: 16 U/L
BASOPHILS # BLD AUTO: 0 K/UL
BASOPHILS NFR BLD AUTO: 0 %
BILIRUB SERPL-MCNC: 0.5 MG/DL
BUN SERPL-MCNC: 9 MG/DL
CALCIUM SERPL-MCNC: 10.1 MG/DL
CHLORIDE SERPL-SCNC: 102 MMOL/L
CHOLEST SERPL-MCNC: 228 MG/DL
CO2 SERPL-SCNC: 27 MMOL/L
CREAT SERPL-MCNC: 0.62 MG/DL
EGFR: 102 ML/MIN/1.73M2
EOSINOPHIL # BLD AUTO: 0.2 K/UL
EOSINOPHIL NFR BLD AUTO: 9 %
ESTIMATED AVERAGE GLUCOSE: 103 MG/DL
GLUCOSE SERPL-MCNC: 90 MG/DL
HBA1C MFR BLD HPLC: 5.2 %
HCT VFR BLD CALC: 42.3 %
HDLC SERPL-MCNC: 66 MG/DL
HGB BLD-MCNC: 13.8 G/DL
LDLC SERPL CALC-MCNC: 139 MG/DL
LYMPHOCYTES # BLD AUTO: 1.14 K/UL
LYMPHOCYTES NFR BLD AUTO: 50.5 %
MAN DIFF?: NORMAL
MCHC RBC-ENTMCNC: 29.1 PG
MCHC RBC-ENTMCNC: 32.6 GM/DL
MCV RBC AUTO: 89.2 FL
MONOCYTES # BLD AUTO: 0.35 K/UL
MONOCYTES NFR BLD AUTO: 15.3 %
NEUTROPHILS # BLD AUTO: 0.57 K/UL
NEUTROPHILS NFR BLD AUTO: 25.2 %
NONHDLC SERPL-MCNC: 162 MG/DL
PLATELET # BLD AUTO: 376 K/UL
POTASSIUM SERPL-SCNC: 4.8 MMOL/L
PROT SERPL-MCNC: 7.9 G/DL
RBC # BLD: 4.74 M/UL
RBC # FLD: 13.4 %
SODIUM SERPL-SCNC: 140 MMOL/L
TRIGL SERPL-MCNC: 116 MG/DL
TSH SERPL-ACNC: 2.82 UIU/ML
WBC # FLD AUTO: 2.26 K/UL

## 2022-06-01 ENCOUNTER — APPOINTMENT (OUTPATIENT)
Dept: MAMMOGRAPHY | Facility: IMAGING CENTER | Age: 62
End: 2022-06-01
Payer: COMMERCIAL

## 2022-06-01 ENCOUNTER — RESULT REVIEW (OUTPATIENT)
Age: 62
End: 2022-06-01

## 2022-06-01 ENCOUNTER — APPOINTMENT (OUTPATIENT)
Dept: ULTRASOUND IMAGING | Facility: IMAGING CENTER | Age: 62
End: 2022-06-01
Payer: COMMERCIAL

## 2022-06-01 ENCOUNTER — OUTPATIENT (OUTPATIENT)
Dept: OUTPATIENT SERVICES | Facility: HOSPITAL | Age: 62
LOS: 1 days | End: 2022-06-01
Payer: COMMERCIAL

## 2022-06-01 DIAGNOSIS — Z00.8 ENCOUNTER FOR OTHER GENERAL EXAMINATION: ICD-10-CM

## 2022-06-01 PROCEDURE — 77063 BREAST TOMOSYNTHESIS BI: CPT

## 2022-06-01 PROCEDURE — 77063 BREAST TOMOSYNTHESIS BI: CPT | Mod: 26

## 2022-06-01 PROCEDURE — 76641 ULTRASOUND BREAST COMPLETE: CPT

## 2022-06-01 PROCEDURE — 77067 SCR MAMMO BI INCL CAD: CPT | Mod: 26

## 2022-06-01 PROCEDURE — 76641 ULTRASOUND BREAST COMPLETE: CPT | Mod: 26,50

## 2022-06-01 PROCEDURE — 77067 SCR MAMMO BI INCL CAD: CPT

## 2022-06-22 ENCOUNTER — APPOINTMENT (OUTPATIENT)
Dept: OBGYN | Facility: CLINIC | Age: 62
End: 2022-06-22

## 2023-02-27 ENCOUNTER — APPOINTMENT (OUTPATIENT)
Dept: INTERNAL MEDICINE | Facility: CLINIC | Age: 63
End: 2023-02-27
Payer: MEDICAID

## 2023-02-27 ENCOUNTER — RX CHANGE (OUTPATIENT)
Age: 63
End: 2023-02-27

## 2023-02-27 VITALS
HEART RATE: 76 BPM | BODY MASS INDEX: 34.36 KG/M2 | HEIGHT: 60 IN | TEMPERATURE: 98.6 F | SYSTOLIC BLOOD PRESSURE: 146 MMHG | OXYGEN SATURATION: 99 % | WEIGHT: 175 LBS | DIASTOLIC BLOOD PRESSURE: 86 MMHG

## 2023-02-27 PROCEDURE — 99214 OFFICE O/P EST MOD 30 MIN: CPT

## 2023-02-27 RX ORDER — FAMOTIDINE 20 MG/1
20 TABLET, FILM COATED ORAL
Qty: 90 | Refills: 3 | Status: DISCONTINUED | COMMUNITY
Start: 2020-11-11 | End: 2023-02-27

## 2023-02-27 RX ORDER — FAMOTIDINE 20 MG/1
20 TABLET, FILM COATED ORAL
Qty: 90 | Refills: 0 | Status: DISCONTINUED | COMMUNITY
Start: 2023-02-27 | End: 2023-02-27

## 2023-02-27 NOTE — HISTORY OF PRESENT ILLNESS
[de-identified] : 62 y/o female for f/u\par \par Last seen in May 2022\par Hx of chronic neutropenia (asymptomatic) and GERD\par \par c/o problems with her stomach\par difficulty with bowel, pebble like Bowel movements\par has abdominal burning that seems to improve with lying down and passes gas.\par \par Using pantoprazole regularly in the morning (40mg) is not using famotidine at night\par has burning pain at night which wakes her\par no nausea, no vomiting, does feel food takes a while to make it down her esophagus.\par \par C/o terrible cough x 2 year, comes and goes.  not now.  \par WOrse if she talks a lot, worse when she is wearing a mask\par sometimes she feels a drip in her throat, feels it is worse with allergies (takes zyrtec in spring and fall).

## 2023-02-27 NOTE — HISTORY OF PRESENT ILLNESS
[de-identified] : 64 y/o female for f/u\par \par Last seen in May 2022\par Hx of chronic neutropenia (asymptomatic) and GERD\par \par c/o problems with her stomach\par difficulty with bowel, pebble like Bowel movements\par has abdominal burning that seems to improve with lying down and passes gas.\par \par Using pantoprazole regularly in the morning (40mg) is not using famotidine at night\par has burning pain at night which wakes her\par no nausea, no vomiting, does feel food takes a while to make it down her esophagus.\par \par C/o terrible cough x 2 year, comes and goes.  not now.  \par WOrse if she talks a lot, worse when she is wearing a mask\par sometimes she feels a drip in her throat, feels it is worse with allergies (takes zyrtec in spring and fall).

## 2023-02-27 NOTE — ASSESSMENT
[FreeTextEntry1] : 64 y/o female for f/u\par Neutropenia, chronic, asymptomatic:  \par \par Abd issues:\par \par Burning:  reviewed dietary changes that would be helpful for a patient with reflux\par suugested adding evening famotidine\par again advised f/u GI appoitment\par \par Start 10 minutes of walking 4 days a week, move to 20minutes 4 days a week\par \par Constipation:  increase fiber, healthier eating, exercise continue good hydratrion tactics\par \par ELevated BP:  f/u in May for CPE, if still elevated may need medications

## 2023-03-30 ENCOUNTER — NON-APPOINTMENT (OUTPATIENT)
Age: 63
End: 2023-03-30

## 2023-04-03 ENCOUNTER — RX CHANGE (OUTPATIENT)
Age: 63
End: 2023-04-03

## 2023-04-04 ENCOUNTER — RX CHANGE (OUTPATIENT)
Age: 63
End: 2023-04-04

## 2023-04-04 RX ORDER — NIZATIDINE 150 MG/1
150 CAPSULE ORAL
Qty: 0 | Refills: 0 | Status: DISCONTINUED | COMMUNITY
Start: 2023-02-27 | End: 2023-04-04

## 2023-05-18 ENCOUNTER — NON-APPOINTMENT (OUTPATIENT)
Age: 63
End: 2023-05-18

## 2023-05-24 ENCOUNTER — APPOINTMENT (OUTPATIENT)
Dept: GASTROENTEROLOGY | Facility: CLINIC | Age: 63
End: 2023-05-24
Payer: COMMERCIAL

## 2023-05-24 VITALS
HEIGHT: 60 IN | BODY MASS INDEX: 34.16 KG/M2 | WEIGHT: 174 LBS | HEART RATE: 96 BPM | SYSTOLIC BLOOD PRESSURE: 125 MMHG | DIASTOLIC BLOOD PRESSURE: 75 MMHG

## 2023-05-24 DIAGNOSIS — K44.9 DIAPHRAGMATIC HERNIA W/OUT OBSTRUCTION OR GANGRENE: ICD-10-CM

## 2023-05-24 DIAGNOSIS — R10.30 LOWER ABDOMINAL PAIN, UNSPECIFIED: ICD-10-CM

## 2023-05-24 PROCEDURE — 99214 OFFICE O/P EST MOD 30 MIN: CPT

## 2023-05-24 NOTE — ASSESSMENT
[FreeTextEntry1] : 1.  Constipation.  Colonoscopy in December 2020 with redundant left colon with internal hemorrhoids.\par 2.  Lower abdominal pain.  May be due to constipation, bloating, MSK with lumbar radiculopathy, Gyn etiology.\par 3.  GERD, stable on PPI.  EGD in 2020 with 6 cm hiatus hernia, esophagitis.\par 4.  Obesity.\par 5.  History of iron deficiency anemia- secondary to menorrhagia.\par \par Recs:\par - Prior records reviewed.\par - Continue GERD diet, lifestyle modifications.\par - Can attempt to wean PPI by alternating 40 mg and 20 mg doses.  Omeprazole and famotidine refilled.\par - Linzess script sent for constipation.\par - If lower abdominal pain recurs, consider CT A/P.\par - Repeat colonoscopy in 2030.

## 2023-05-24 NOTE — HISTORY OF PRESENT ILLNESS
[de-identified] : 12/2020- hiatus hernia [FreeTextEntry1] : 12/2020- redundant, tortuous left colon

## 2023-06-05 ENCOUNTER — APPOINTMENT (OUTPATIENT)
Dept: INTERNAL MEDICINE | Facility: CLINIC | Age: 63
End: 2023-06-05
Payer: MEDICAID

## 2023-06-05 VITALS
BODY MASS INDEX: 34.36 KG/M2 | HEIGHT: 60 IN | TEMPERATURE: 98.5 F | WEIGHT: 175 LBS | SYSTOLIC BLOOD PRESSURE: 126 MMHG | OXYGEN SATURATION: 97 % | HEART RATE: 91 BPM | DIASTOLIC BLOOD PRESSURE: 82 MMHG

## 2023-06-05 DIAGNOSIS — K21.9 GASTRO-ESOPHAGEAL REFLUX DISEASE W/OUT ESOPHAGITIS: ICD-10-CM

## 2023-06-05 PROCEDURE — 36415 COLL VENOUS BLD VENIPUNCTURE: CPT

## 2023-06-05 PROCEDURE — 99396 PREV VISIT EST AGE 40-64: CPT | Mod: 25

## 2023-06-05 RX ORDER — OMEPRAZOLE 20 MG/1
20 CAPSULE, DELAYED RELEASE ORAL DAILY
Qty: 20 | Refills: 1 | Status: DISCONTINUED | COMMUNITY
Start: 2023-05-24 | End: 2023-06-05

## 2023-06-05 NOTE — ASSESSMENT
[FreeTextEntry1] : 62 y/o female for  cpe\par \par Neutropenia, chronic, asymptomatic:  \par check CBC for stability\par Augmentin rx for travelling in the event she becomes ill\par \par ELevated BPs: stable today\par \par \par GI: recent visit with Dr. Love\par Chronic heartburn: advised weaning ppi 40 alt with 20\par Constipation: linzess prescribed\par CT A/p if not improving\par \par HCM:\par Mammo DUe\par Metabolic screening due\par CRC screening: CLS 2020, repeat 2030\par Eligible for shingrix\par \par

## 2023-06-05 NOTE — HISTORY OF PRESENT ILLNESS
[de-identified] : 62 y/o female for CPE\par \par doing okay with tapering down on PPI now on 40/20\par Bowels are a lot better though still has intermittent right sided pain.\par Plans to do CT scan\par \par Stopped coffee, this has helped her pressure

## 2023-06-06 LAB
ABO + RH PNL BLD: NORMAL
ALBUMIN SERPL ELPH-MCNC: 4.5 G/DL
ALP BLD-CCNC: 97 U/L
ALT SERPL-CCNC: 14 U/L
ANION GAP SERPL CALC-SCNC: 16 MMOL/L
AST SERPL-CCNC: 21 U/L
BILIRUB SERPL-MCNC: 0.4 MG/DL
BUN SERPL-MCNC: 10 MG/DL
CALCIUM SERPL-MCNC: 9.5 MG/DL
CHLORIDE SERPL-SCNC: 103 MMOL/L
CHOLEST SERPL-MCNC: 208 MG/DL
CO2 SERPL-SCNC: 22 MMOL/L
CREAT SERPL-MCNC: 0.59 MG/DL
EGFR: 102 ML/MIN/1.73M2
ESTIMATED AVERAGE GLUCOSE: 100 MG/DL
GLUCOSE SERPL-MCNC: 83 MG/DL
HBA1C MFR BLD HPLC: 5.1 %
HDLC SERPL-MCNC: 52 MG/DL
LDLC SERPL CALC-MCNC: 121 MG/DL
NONHDLC SERPL-MCNC: 156 MG/DL
POTASSIUM SERPL-SCNC: 4.5 MMOL/L
PROT SERPL-MCNC: 7.6 G/DL
SODIUM SERPL-SCNC: 141 MMOL/L
TRIGL SERPL-MCNC: 172 MG/DL
TSH SERPL-ACNC: 2.84 UIU/ML

## 2023-06-20 ENCOUNTER — RX CHANGE (OUTPATIENT)
Age: 63
End: 2023-06-20

## 2023-06-20 ENCOUNTER — APPOINTMENT (OUTPATIENT)
Dept: INTERNAL MEDICINE | Facility: CLINIC | Age: 63
End: 2023-06-20

## 2023-06-20 ENCOUNTER — APPOINTMENT (OUTPATIENT)
Dept: INTERNAL MEDICINE | Facility: CLINIC | Age: 63
End: 2023-06-20
Payer: COMMERCIAL

## 2023-06-20 VITALS
TEMPERATURE: 97.6 F | HEART RATE: 78 BPM | BODY MASS INDEX: 34.36 KG/M2 | DIASTOLIC BLOOD PRESSURE: 89 MMHG | OXYGEN SATURATION: 98 % | RESPIRATION RATE: 18 BRPM | HEIGHT: 60 IN | SYSTOLIC BLOOD PRESSURE: 133 MMHG | WEIGHT: 175 LBS

## 2023-06-20 PROCEDURE — 93000 ELECTROCARDIOGRAM COMPLETE: CPT

## 2023-06-20 PROCEDURE — 99214 OFFICE O/P EST MOD 30 MIN: CPT | Mod: 25

## 2023-06-20 NOTE — PHYSICAL EXAM
[PERRL] : pupils equal round and reactive to light [EOMI] : extraocular movements intact [Normal] : soft, non-tender, non-distended, no masses palpated, no HSM and normal bowel sounds

## 2023-06-20 NOTE — HISTORY OF PRESENT ILLNESS
[FreeTextEntry8] : \par 63 y.o. female, PMHx neutropenia, constipation, GERD, OA knees seen as acute \par \par woke up Saturday with bumps on left forehead - yesterady hand bad headace - mild fever \par - legs and knee were hurting \par - chest discomfort with walking only \par -no vision problems , no blurry vision

## 2023-06-20 NOTE — ASSESSMENT
[FreeTextEntry1] : left forehead shingles , scalp tenderness 6/17/23\par -ophtho evaluation - pt has Ophth will call to schedule appt \par -start valtrex 1 gm po TID x 1 week \par -start gabapentin 100 po TID titrate up as tolerated \par -contact precaution - avoid pregnant pts , infants, immunocompramised pt, on chemoetc \par - adv shingrex vaccine in 4 months \par \par Chest discomfort on walking only \par -EKG NSR @81 no st changes

## 2023-06-20 NOTE — REVIEW OF SYSTEMS
[Itching] : Itching [Skin Rash] : skin rash [Negative] : Gastrointestinal [de-identified] : left forehead bumps /rash 3 dasy

## 2023-07-03 ENCOUNTER — RESULT REVIEW (OUTPATIENT)
Age: 63
End: 2023-07-03

## 2023-07-03 ENCOUNTER — APPOINTMENT (OUTPATIENT)
Dept: MAMMOGRAPHY | Facility: IMAGING CENTER | Age: 63
End: 2023-07-03
Payer: COMMERCIAL

## 2023-07-03 ENCOUNTER — OUTPATIENT (OUTPATIENT)
Dept: OUTPATIENT SERVICES | Facility: HOSPITAL | Age: 63
LOS: 1 days | End: 2023-07-03
Payer: MEDICAID

## 2023-07-03 DIAGNOSIS — Z00.00 ENCOUNTER FOR GENERAL ADULT MEDICAL EXAMINATION WITHOUT ABNORMAL FINDINGS: ICD-10-CM

## 2023-07-03 DIAGNOSIS — Z00.8 ENCOUNTER FOR OTHER GENERAL EXAMINATION: ICD-10-CM

## 2023-07-03 PROCEDURE — 77063 BREAST TOMOSYNTHESIS BI: CPT

## 2023-07-03 PROCEDURE — 77067 SCR MAMMO BI INCL CAD: CPT

## 2023-07-03 PROCEDURE — 77067 SCR MAMMO BI INCL CAD: CPT | Mod: 26

## 2023-07-03 PROCEDURE — 77063 BREAST TOMOSYNTHESIS BI: CPT | Mod: 26

## 2023-07-07 ENCOUNTER — OUTPATIENT (OUTPATIENT)
Dept: OUTPATIENT SERVICES | Facility: HOSPITAL | Age: 63
LOS: 1 days | End: 2023-07-07
Payer: MEDICAID

## 2023-07-07 ENCOUNTER — APPOINTMENT (OUTPATIENT)
Dept: CT IMAGING | Facility: IMAGING CENTER | Age: 63
End: 2023-07-07
Payer: COMMERCIAL

## 2023-07-07 DIAGNOSIS — R10.30 LOWER ABDOMINAL PAIN, UNSPECIFIED: ICD-10-CM

## 2023-07-07 PROCEDURE — 74177 CT ABD & PELVIS W/CONTRAST: CPT | Mod: 26

## 2023-07-07 PROCEDURE — 74177 CT ABD & PELVIS W/CONTRAST: CPT

## 2024-01-17 RX ORDER — OMEPRAZOLE 40 MG/1
40 CAPSULE, DELAYED RELEASE ORAL
Qty: 90 | Refills: 1 | Status: DISCONTINUED | COMMUNITY
End: 2024-01-17

## 2024-01-23 RX ORDER — PANTOPRAZOLE 20 MG/1
20 TABLET, DELAYED RELEASE ORAL
Qty: 90 | Refills: 3 | Status: ACTIVE | COMMUNITY
Start: 1900-01-01 | End: 1900-01-01

## 2024-01-23 RX ORDER — LINACLOTIDE 145 UG/1
145 CAPSULE, GELATIN COATED ORAL
Qty: 90 | Refills: 3 | Status: ACTIVE | COMMUNITY
Start: 2023-05-24 | End: 1900-01-01

## 2024-01-23 RX ORDER — FAMOTIDINE 40 MG/1
40 TABLET, FILM COATED ORAL
Qty: 90 | Refills: 3 | Status: ACTIVE | COMMUNITY
Start: 2023-04-04 | End: 1900-01-01

## 2024-03-14 ENCOUNTER — APPOINTMENT (OUTPATIENT)
Dept: INTERNAL MEDICINE | Facility: CLINIC | Age: 64
End: 2024-03-14

## 2024-06-26 ENCOUNTER — NON-APPOINTMENT (OUTPATIENT)
Age: 64
End: 2024-06-26

## 2024-06-27 ENCOUNTER — LABORATORY RESULT (OUTPATIENT)
Age: 64
End: 2024-06-27

## 2024-06-27 ENCOUNTER — APPOINTMENT (OUTPATIENT)
Dept: INTERNAL MEDICINE | Facility: CLINIC | Age: 64
End: 2024-06-27
Payer: COMMERCIAL

## 2024-06-27 VITALS
BODY MASS INDEX: 33.38 KG/M2 | SYSTOLIC BLOOD PRESSURE: 150 MMHG | DIASTOLIC BLOOD PRESSURE: 91 MMHG | HEIGHT: 60 IN | WEIGHT: 170 LBS | HEART RATE: 80 BPM | TEMPERATURE: 97.9 F | OXYGEN SATURATION: 97 %

## 2024-06-27 VITALS — SYSTOLIC BLOOD PRESSURE: 160 MMHG | DIASTOLIC BLOOD PRESSURE: 90 MMHG

## 2024-06-27 DIAGNOSIS — J30.2 OTHER SEASONAL ALLERGIC RHINITIS: ICD-10-CM

## 2024-06-27 DIAGNOSIS — D70.9 NEUTROPENIA, UNSPECIFIED: ICD-10-CM

## 2024-06-27 DIAGNOSIS — H53.9 UNSPECIFIED VISUAL DISTURBANCE: ICD-10-CM

## 2024-06-27 DIAGNOSIS — I83.93 ASYMPTOMATIC VARICOSE VEINS OF BILATERAL LOWER EXTREMITIES: ICD-10-CM

## 2024-06-27 DIAGNOSIS — Z00.00 ENCOUNTER FOR GENERAL ADULT MEDICAL EXAMINATION W/OUT ABNORMAL FINDINGS: ICD-10-CM

## 2024-06-27 DIAGNOSIS — M17.0 BILATERAL PRIMARY OSTEOARTHRITIS OF KNEE: ICD-10-CM

## 2024-06-27 DIAGNOSIS — R03.0 ELEVATED BLOOD-PRESSURE READING, W/OUT DIAGNOSIS OF HYPERTENSION: ICD-10-CM

## 2024-06-27 DIAGNOSIS — K59.00 CONSTIPATION, UNSPECIFIED: ICD-10-CM

## 2024-06-27 DIAGNOSIS — Z87.898 PERSONAL HISTORY OF OTHER SPECIFIED CONDITIONS: ICD-10-CM

## 2024-06-27 DIAGNOSIS — Z01.818 ENCOUNTER FOR OTHER PREPROCEDURAL EXAMINATION: ICD-10-CM

## 2024-06-27 DIAGNOSIS — Z11.59 ENCOUNTER FOR SCREENING FOR OTHER VIRAL DISEASES: ICD-10-CM

## 2024-06-27 DIAGNOSIS — Z86.19 PERSONAL HISTORY OF OTHER INFECTIOUS AND PARASITIC DISEASES: ICD-10-CM

## 2024-06-27 DIAGNOSIS — I10 ESSENTIAL (PRIMARY) HYPERTENSION: ICD-10-CM

## 2024-06-27 DIAGNOSIS — R07.89 OTHER CHEST PAIN: ICD-10-CM

## 2024-06-27 DIAGNOSIS — H93.8X2 OTHER SPECIFIED DISORDERS OF LEFT EAR: ICD-10-CM

## 2024-06-27 PROCEDURE — 36415 COLL VENOUS BLD VENIPUNCTURE: CPT

## 2024-06-27 PROCEDURE — 99396 PREV VISIT EST AGE 40-64: CPT

## 2024-07-01 ENCOUNTER — NON-APPOINTMENT (OUTPATIENT)
Age: 64
End: 2024-07-01

## 2024-07-01 LAB
ALBUMIN SERPL ELPH-MCNC: 4.2 G/DL
ALP BLD-CCNC: 100 U/L
ALT SERPL-CCNC: 15 U/L
ANION GAP SERPL CALC-SCNC: 12 MMOL/L
AST SERPL-CCNC: 16 U/L
BASOPHILS # BLD AUTO: 0.02 K/UL
BASOPHILS NFR BLD AUTO: 0.9 %
BILIRUB SERPL-MCNC: 0.4 MG/DL
BUN SERPL-MCNC: 12 MG/DL
CALCIUM SERPL-MCNC: 9.5 MG/DL
CHLORIDE SERPL-SCNC: 106 MMOL/L
CHOLEST SERPL-MCNC: 190 MG/DL
CO2 SERPL-SCNC: 25 MMOL/L
CREAT SERPL-MCNC: 0.59 MG/DL
EGFR: 102 ML/MIN/1.73M2
EOSINOPHIL # BLD AUTO: 0.21 K/UL
EOSINOPHIL NFR BLD AUTO: 10.4 %
GLUCOSE SERPL-MCNC: 68 MG/DL
HCT VFR BLD CALC: 39.6 %
HDLC SERPL-MCNC: 58 MG/DL
HGB BLD-MCNC: 12.7 G/DL
LDLC SERPL CALC-MCNC: 118 MG/DL
LYMPHOCYTES # BLD AUTO: 1.29 K/UL
LYMPHOCYTES NFR BLD AUTO: 65.2 %
MAN DIFF?: NORMAL
MCHC RBC-ENTMCNC: 28.6 PG
MCHC RBC-ENTMCNC: 32.1 GM/DL
MCV RBC AUTO: 89.2 FL
MONOCYTES # BLD AUTO: 0.24 K/UL
MONOCYTES NFR BLD AUTO: 12.2 %
NEUTROPHILS # BLD AUTO: 0.22 K/UL
NEUTROPHILS NFR BLD AUTO: 11.3 %
NONHDLC SERPL-MCNC: 133 MG/DL
PLATELET # BLD AUTO: 334 K/UL
POTASSIUM SERPL-SCNC: 4.4 MMOL/L
PROT SERPL-MCNC: 7.5 G/DL
RBC # BLD: 4.44 M/UL
RBC # FLD: 13.9 %
SODIUM SERPL-SCNC: 143 MMOL/L
TRIGL SERPL-MCNC: 84 MG/DL
TSH SERPL-ACNC: 2.91 UIU/ML
WBC # FLD AUTO: 1.98 K/UL

## 2024-07-05 ENCOUNTER — APPOINTMENT (OUTPATIENT)
Dept: MAMMOGRAPHY | Facility: IMAGING CENTER | Age: 64
End: 2024-07-05
Payer: COMMERCIAL

## 2024-07-05 ENCOUNTER — RESULT REVIEW (OUTPATIENT)
Age: 64
End: 2024-07-05

## 2024-07-05 ENCOUNTER — OUTPATIENT (OUTPATIENT)
Dept: OUTPATIENT SERVICES | Facility: HOSPITAL | Age: 64
LOS: 1 days | End: 2024-07-05
Payer: COMMERCIAL

## 2024-07-05 DIAGNOSIS — Z00.00 ENCOUNTER FOR GENERAL ADULT MEDICAL EXAMINATION WITHOUT ABNORMAL FINDINGS: ICD-10-CM

## 2024-07-05 PROCEDURE — 77067 SCR MAMMO BI INCL CAD: CPT | Mod: 26

## 2024-07-05 PROCEDURE — 77063 BREAST TOMOSYNTHESIS BI: CPT

## 2024-07-05 PROCEDURE — 77067 SCR MAMMO BI INCL CAD: CPT

## 2024-07-05 PROCEDURE — 77063 BREAST TOMOSYNTHESIS BI: CPT | Mod: 26

## 2024-08-06 ENCOUNTER — OUTPATIENT (OUTPATIENT)
Dept: OUTPATIENT SERVICES | Facility: HOSPITAL | Age: 64
LOS: 1 days | Discharge: HOME | End: 2024-08-06

## 2024-08-06 DIAGNOSIS — D72.819 DECREASED WHITE BLOOD CELL COUNT, UNSPECIFIED: ICD-10-CM

## 2024-08-08 ENCOUNTER — APPOINTMENT (OUTPATIENT)
Dept: HEMATOLOGY ONCOLOGY | Facility: CLINIC | Age: 64
End: 2024-08-08

## 2024-08-08 ENCOUNTER — RESULT REVIEW (OUTPATIENT)
Age: 64
End: 2024-08-08

## 2024-08-08 LAB
BASOPHILS # BLD AUTO: 0.05 K/UL — SIGNIFICANT CHANGE UP (ref 0–0.2)
BASOPHILS NFR BLD AUTO: 2.5 % — HIGH (ref 0–2)
EOSINOPHIL # BLD AUTO: 0.1 K/UL — SIGNIFICANT CHANGE UP (ref 0–0.5)
EOSINOPHIL NFR BLD AUTO: 5.1 % — SIGNIFICANT CHANGE UP (ref 0–6)
HCT VFR BLD CALC: 42.2 % — SIGNIFICANT CHANGE UP (ref 34.5–45)
HGB BLD-MCNC: 13.9 G/DL — SIGNIFICANT CHANGE UP (ref 11.5–15.5)
IMM GRANULOCYTES NFR BLD AUTO: 0 % — SIGNIFICANT CHANGE UP (ref 0–0.9)
LYMPHOCYTES # BLD AUTO: 0.82 K/UL — LOW (ref 1–3.3)
LYMPHOCYTES # BLD AUTO: 41.6 % — SIGNIFICANT CHANGE UP (ref 13–44)
MCHC RBC-ENTMCNC: 29.1 PG — SIGNIFICANT CHANGE UP (ref 27–34)
MCHC RBC-ENTMCNC: 32.9 G/DL — SIGNIFICANT CHANGE UP (ref 32–36)
MCV RBC AUTO: 88.3 FL — SIGNIFICANT CHANGE UP (ref 80–100)
MONOCYTES # BLD AUTO: 0.35 K/UL — SIGNIFICANT CHANGE UP (ref 0–0.9)
MONOCYTES NFR BLD AUTO: 17.8 % — HIGH (ref 2–14)
NEUTROPHILS # BLD AUTO: 0.65 K/UL — LOW (ref 1.8–7.4)
NEUTROPHILS NFR BLD AUTO: 33 % — LOW (ref 43–77)
NRBC # BLD: 0 /100 WBCS — SIGNIFICANT CHANGE UP (ref 0–0)
NRBC BLD-RTO: 0 /100 WBCS — SIGNIFICANT CHANGE UP (ref 0–0)
PLATELET # BLD AUTO: 301 K/UL — SIGNIFICANT CHANGE UP (ref 150–400)
RBC # BLD: 4.78 M/UL — SIGNIFICANT CHANGE UP (ref 3.8–5.2)
RBC # FLD: 13.4 % — SIGNIFICANT CHANGE UP (ref 10.3–14.5)
WBC # BLD: 1.97 K/UL — LOW (ref 3.8–10.5)
WBC # FLD AUTO: 1.97 K/UL — LOW (ref 3.8–10.5)

## 2024-08-08 PROCEDURE — 99205 OFFICE O/P NEW HI 60 MIN: CPT

## 2024-08-08 NOTE — ASSESSMENT
[FreeTextEntry1] : YAMEL NICHOLS is a 64 year old woman with PMH of HTN and HLD, who presents for Hematology evaluation of chronic neutropenia.   # Chronic neutropenia: She has a history of chronic leukopenia (ricardo 1.7) and neutropenia (ricardo 0.2) since her 20s. Peripheral flow cytometry and bone marrow biopsy in 2015 was unrevealing with normal myeloid lineage. Thought to have autoimmune/idiopathic neutropenia. No recent or recurrent infections. We will evaluate for nutritional deficiencies, autoimmune disorders, infection, and hematologic disorders. - CBC and CMP - Ferritin, iron studies, B12, folate, copper - GUMARO - HIV, HBV, and HCV serologies - Peripheral flow cytometry - If above work up is negative, will consider congenital neutropenia panel and/or repeat bone marrow biopsy - Pending lab results, will arrange follow up as clinically indicated

## 2024-08-08 NOTE — HISTORY OF PRESENT ILLNESS
[de-identified] : YAMEL NICHOLS is a 64 year old woman with PMH of HTN and HLD, who presents for Hematology evaluation of chronic neutropenia.   She has a history of chronic leukopenia (ricardo 1.7) and neutropenia (ricardo 0.2) dating back to at least 2014 per our records, but patient reports that she was told about a low WBC since her 20s. She previously saw multiple hematologists in the past, most recently Dr. Degroot in 2019. Peripheral flow cytometry and bone marrow biopsy in 2015 was unrevealing with normal myeloid lineage. She had negative HIV and hepatitis serologies, normal copper/zinc levels, negative rheumatologic panel except for a minimally high GUMARO, normal immunoglobulins, and normal TSH. She was thought to have autoimmune/idiopathic neutropenia. She was previously given G-CSF but did not tolerate due to fever, chills, and bone pain.   Her most recent CBC from 6/27/24 showed WBC 1.98 (ANC 0.22), Hgb 12.7, and plt 334. She denies any recent or recurrent infections. No constitutional symptoms.   Family History: - No history of hematologic disorders or cancer    Social History: - Lives with her  and 2 children - Works in a school as a "lunch helper" - Former smoker (10-15 cigarettes daily x 10 years, quit about 25 years ago) - Denies EtOH and recreational drug use.

## 2024-08-08 NOTE — REVIEW OF SYSTEMS
[Diarrhea: Grade 0] : Diarrhea: Grade 0 [Negative] : Gastrointestinal [Abdominal Pain] : no abdominal pain [Vomiting] : no vomiting [Constipation] : no constipation [FreeTextEntry7] : chronic acid reflux; controlled with pepcid

## 2024-08-08 NOTE — HISTORY OF PRESENT ILLNESS
[de-identified] : YAMEL NICHOLS is a 64 year old woman with PMH of HTN and HLD, who presents for Hematology evaluation of chronic neutropenia.   She has a history of chronic leukopenia (ricardo 1.7) and neutropenia (ricardo 0.2) dating back to at least 2014 per our records, but patient reports that she was told about a low WBC since her 20s. She previously saw multiple hematologists in the past, most recently Dr. Degroot in 2019. Peripheral flow cytometry and bone marrow biopsy in 2015 was unrevealing with normal myeloid lineage. She had negative HIV and hepatitis serologies, normal copper/zinc levels, negative rheumatologic panel except for a minimally high GUMARO, normal immunoglobulins, and normal TSH. She was thought to have autoimmune/idiopathic neutropenia. She was previously given G-CSF but did not tolerate due to fever, chills, and bone pain.   Her most recent CBC from 6/27/24 showed WBC 1.98 (ANC 0.22), Hgb 12.7, and plt 334. She denies any recent or recurrent infections. No constitutional symptoms.   Family History: - No history of hematologic disorders or cancer    Social History: - Lives with her  and 2 children - Works in a school as a "lunch helper" - Former smoker (10-15 cigarettes daily x 10 years, quit about 25 years ago) - Denies EtOH and recreational drug use.

## 2024-08-14 ENCOUNTER — NON-APPOINTMENT (OUTPATIENT)
Age: 64
End: 2024-08-14

## 2024-09-05 ENCOUNTER — APPOINTMENT (OUTPATIENT)
Dept: VASCULAR SURGERY | Facility: CLINIC | Age: 64
End: 2024-09-05
Payer: COMMERCIAL

## 2024-09-05 VITALS
BODY MASS INDEX: 33.77 KG/M2 | HEIGHT: 60 IN | WEIGHT: 172 LBS | SYSTOLIC BLOOD PRESSURE: 138 MMHG | DIASTOLIC BLOOD PRESSURE: 87 MMHG | OXYGEN SATURATION: 99 % | HEART RATE: 94 BPM | TEMPERATURE: 97.8 F

## 2024-09-05 DIAGNOSIS — R29.898 OTHER SYMPTOMS AND SIGNS INVOLVING THE MUSCULOSKELETAL SYSTEM: ICD-10-CM

## 2024-09-05 DIAGNOSIS — I83.893 VARICOSE VEINS OF BILATERAL LOWER EXTREMITIES WITH OTHER COMPLICATIONS: ICD-10-CM

## 2024-09-05 DIAGNOSIS — M79.89 OTHER SPECIFIED SOFT TISSUE DISORDERS: ICD-10-CM

## 2024-09-05 DIAGNOSIS — I87.2 VENOUS INSUFFICIENCY (CHRONIC) (PERIPHERAL): ICD-10-CM

## 2024-09-05 DIAGNOSIS — I83.813 VARICOSE VEINS OF BILATERAL LOWER EXTREMITIES WITH PAIN: ICD-10-CM

## 2024-09-05 DIAGNOSIS — L30.9 DERMATITIS, UNSPECIFIED: ICD-10-CM

## 2024-09-05 PROCEDURE — 93970 EXTREMITY STUDY: CPT

## 2024-09-05 PROCEDURE — 99204 OFFICE O/P NEW MOD 45 MIN: CPT | Mod: 25

## 2024-09-05 NOTE — REASON FOR VISIT
[Initial Evaluation] : an initial evaluation [FreeTextEntry1] : pt here with chronic and worsening b/l leg CVI with ache pain swelling fatigue cramps heaviness dilated diffuse varicose veins - CEAP 4 bothlegs - b/l vle with rvt shows b/l gsv and b/l ssv reflux, no dvt. Pt has tried elevation, exercise in past with no relief. Symptoms better in morning and worse at end of day. Pt given Rx for daily use thigh high support hose 20-30 mmHg. Will return for b/l gsv and b/l ssv rfa - will likely require varithena also, stab phlebectomy and sclerotherapy.

## 2024-09-05 NOTE — PHYSICAL EXAM
[2+] : left 2+ [Ankle Swelling (On Exam)] : present [Ankle Swelling Bilaterally] : bilaterally  [Varicose Veins Of Lower Extremities] : bilaterally [] : bilaterally [Ankle Swelling On The Left] : moderate [Purpura] : no purpura  [Petechiae] : no petechiae [Skin Ulcer] : no ulcer [Skin Induration] : no induration [Alert] : alert [Oriented to Person] : oriented to person [Oriented to Place] : oriented to place [Oriented to Time] : oriented to time [Calm] : calm [de-identified] : wdwn nad [FreeTextEntry1] : b/l leg swelling, diffuse b/l large varicose veins, scattered small surface veins, b/l medial ankle dermatitis [de-identified] : as above [de-identified] : as above

## 2024-09-05 NOTE — ASSESSMENT
[FreeTextEntry1] : Vascular surgeon discussed with the patient: Varicose veins are enlarged, twisted veins. Varicose veins are caused by increased blood pressure in the veins.  The blood moves towards the heart by 1-way valves in the veins. When the valves become weakened or damaged, blood can collect in the veins. This causes the veins to become enlarged. Sitting or standing for long periods can cause blood to pool in the leg veins, increasing the pressure within the veins. The veins can stretch from the increased pressure. This may weaken the walls of the veins and damage the valves. Varicose veins may be more common in some families (inherited).  Factors that may increase pressure include:  obesity, older age, being female, pregnancy, taking oral contraceptive pills or hormone replacement, being inactive, and/or smoking.  The most common symptoms of varicose veins are sensations in the legs, such as a heavy feeling, burning, and/or aching. However, each individual may experience symptoms differently.  Other symptoms may include:  color changes in the skin, sores on the legs, or rash.  Severe varicose veins may eventually produce long-term mild swelling that can result in more serious skin and tissue problems, such as ulcers and non-healing sores. Varicose veins are diagnosed by a complete medical history, physical examination, and diagnostic studies for varicose veins including duplex ultrasound and color-flow imaging.   Medical treatment for varicose veins include:  compression stockings, sclerotherapy, RFA, endovenous laser ablation and/or surgical treatment microphlebectomy.    rt gsv, left gsv, rt lsv, left lsv RFA Rx thigh high support hose 20-30 mmHg  [Other: _____] : [unfilled]

## 2024-09-19 ENCOUNTER — APPOINTMENT (OUTPATIENT)
Dept: VASCULAR SURGERY | Facility: CLINIC | Age: 64
End: 2024-09-19
Payer: COMMERCIAL

## 2024-09-19 VITALS — SYSTOLIC BLOOD PRESSURE: 151 MMHG | TEMPERATURE: 97 F | HEART RATE: 84 BPM | DIASTOLIC BLOOD PRESSURE: 88 MMHG

## 2024-09-19 DIAGNOSIS — I87.2 VENOUS INSUFFICIENCY (CHRONIC) (PERIPHERAL): ICD-10-CM

## 2024-09-19 PROCEDURE — 36475 ENDOVENOUS RF 1ST VEIN: CPT | Mod: RT

## 2024-09-19 NOTE — REASON FOR VISIT
[Procedure: _________] : a [unfilled] procedure visit [FreeTextEntry1] : Patient has severe reflux right gsv and here for right gsv RFA procedure.  YAMEL NICHOLS proceeded with right gsv RFA with no complications.  YAMEL NICHOLS will follow up in one week for routine visit with VLE.

## 2024-09-19 NOTE — PROCEDURE
[FreeTextEntry1] : right gsv rfa [FreeTextEntry3] : right_lower extremity varicose veins with inflammation, fatigue, heaviness, pain, swelling, cramping heaviness, and dermatitis.  Pt was diagnosed with Chronic Venous insufficiency (CVI), chronic venous hypertension and venous reflux which requires thermal ablation with endovenous radiofrequency to treat the truncal/axial vein reflux (Radiofrequency Ablation, RFA).  Ultrasound examination demonstrated reflux in the right gsv vein with reflux into the patient's varicosities.  A trial of compression stockings, exercise, elevation, and pain medication was attempted without relief and as such, this patient was offered RFA as definitive treatment.  After explaining risks and benefits, informed consent was obtained and the patient was brought to the treatment room.  The patient was escorted to the procedure room and placed in the supine position on the examination table.   The right leg was prepped and draped in the usual sterile fashion and time-out was called.  A sonographic probe was placed into a sterile sheath and the lower extremity was imaged. A suitable access site in the superficial truncal vein to be treated was identified using sonographic guidance and marked at the skin level. 1 mL of 1% lidocaine without epinephrine was administered subcutaneously using a 25G needle.  The RF catheter, dilator and introducer were flushed with saline and wiped down and placed on the sterile field.  Using standard Seldinger technique, access to the saphenous vein was obtained with the needle and corresponding guidewire.  The needle was removed and the 7Fr introducer sheath with dilator was advanced over the wire into the vein. The guide wire and dilator were removed and the RFA fiber catheter was placed into the vein through the introducer sheath.  The position of the RFA  2 cm below the Sapheno-femoral Junction was verified using ultrasound guidance.       Local tumescent anesthesia, under ultrasound guidance, was administered circumferentially around the entire length of vein in the perivenous compartment to ensure a complete "halo" of anesthetic around the vein.  Tumescent anesthesia:  250 mL 0.9% Sodium Chloride was poured into a sterile blue basin and 50 cc of sterile injectable lidocaine 1% (without Epinephrine) was added using a 20 mL syringe.  The RFA location was again confirmed to be appropriate and the RFA energy was applied by pressing the hand button.  The proximal 7 cm was treated for 2 cycles at 20 seconds. The RFA fiber was then withdrawn in sequential 6.5 cm segments and each section was treated with one cycle of 20 seconds thermal energy application. The system was monitored to ensure that the vein wall temperature was within 120 +/- 5 degrees C and the generator output was well below its maximum starting power.   The operation of the RFA was ceased when the fiber end was at its most distal marker as indicated by the distal tip conrad lines.  The sheath and RFA fiber catheter were removed together and manual pressure was applied at access site for hemostasis.  Post procedure, the vein was imaged and showed successful closure along the entire treated length with a patent proximal most 2-3 cm and sapheno-femoral junction.  Dry bandaid was applied to the access site and a compression stocking 20 - 30 mm Hg was applied to the treated extremity.  The patient tolerated the procedure well with no complications.  Vital signs stable, patient was monitored throughout procedure.    Refer to procedure sheet for procedure start and end time, # of total cycles,  total treated vein length treated,  and total treatment duration time documentation.   Post-Op Instructions:  The following instructions were reviewed with the patient and a print out of the instructions provided to patient at time of visit:   1)  Compression stocking to be worn 24 hours per day x 7 days.  2)  Do not get the stocking wet for the first 3 days.  3)  Ambulation immediately following procedure and as much as possible for the first 7 days.  4)  Contact the office if any questions or concerns. 5)  Patient must follow-up within the first week for post procedure reflux study performed in office.  Reviewed with the patient the follow up visit is to ensure that there are no complications such as a deep vein thrombosis (DVT) or endovenous heat-induced thrombus (EHIT).  Patient acknowledged understanding of post-op instructions and was provided with print out of instructions at time of visit.    Reviewed with the patient post-procedure RFA  instructions, provided patient with printed copy of instructions along with Dr. Duval's cell phone number:  [phone], and advised patient to call for any questions or concerns prior to follow up visit.  All questions answered.

## 2024-09-26 ENCOUNTER — APPOINTMENT (OUTPATIENT)
Dept: VASCULAR SURGERY | Facility: CLINIC | Age: 64
End: 2024-09-26
Payer: COMMERCIAL

## 2024-09-26 VITALS
DIASTOLIC BLOOD PRESSURE: 86 MMHG | OXYGEN SATURATION: 98 % | HEART RATE: 80 BPM | TEMPERATURE: 96.9 F | SYSTOLIC BLOOD PRESSURE: 146 MMHG

## 2024-09-26 DIAGNOSIS — I83.891 VARICOSE VEINS OF RIGHT LOWER EXTREMITY WITH OTHER COMPLICATIONS: ICD-10-CM

## 2024-09-26 DIAGNOSIS — I83.11 VARICOSE VEINS OF RIGHT LOWER EXTREMITY WITH INFLAMMATION: ICD-10-CM

## 2024-09-26 PROCEDURE — 93971 EXTREMITY STUDY: CPT | Mod: RT

## 2024-09-26 PROCEDURE — 99213 OFFICE O/P EST LOW 20 MIN: CPT | Mod: 25

## 2024-09-26 NOTE — PHYSICAL EXAM
[2+] : left 2+ [Ankle Swelling (On Exam)] : present [Ankle Swelling Bilaterally] : bilaterally  [Varicose Veins Of Lower Extremities] : bilaterally [] : bilaterally [Ankle Swelling On The Left] : moderate [Purpura] : no purpura  [Petechiae] : no petechiae [Skin Ulcer] : no ulcer [Skin Induration] : no induration [Alert] : alert [Oriented to Person] : oriented to person [Oriented to Place] : oriented to place [Oriented to Time] : oriented to time [Calm] : calm [de-identified] : wdwn nad [FreeTextEntry1] : b/l leg swelling, diffuse b/l large varicose veins, scattered small surface veins, b/l medial ankle dermatitis [de-identified] : as above [de-identified] : as above

## 2024-09-26 NOTE — DATA REVIEWED
[FreeTextEntry1] : i reviewed rt gsv vle with rvt - rt gsv closed s/p rfa, no dvt, no ehit, rt sf junction patent and compressible

## 2024-09-26 NOTE — REASON FOR VISIT
[Follow-Up: _____] : a [unfilled] follow-up visit [FreeTextEntry1] : She is s/p right gsv RFA.  Patient is here today for routine one week follow up visit with ultrasound.  Right leg ultrasound confirms no dvt, no HIIT, no truncal reflux and right gsv is closed as desired. Pt to return for left gsv rfa 10/03/24.

## 2024-10-03 ENCOUNTER — APPOINTMENT (OUTPATIENT)
Dept: VASCULAR SURGERY | Facility: CLINIC | Age: 64
End: 2024-10-03
Payer: COMMERCIAL

## 2024-10-03 VITALS
OXYGEN SATURATION: 100 % | TEMPERATURE: 97.8 F | DIASTOLIC BLOOD PRESSURE: 86 MMHG | HEART RATE: 76 BPM | SYSTOLIC BLOOD PRESSURE: 146 MMHG

## 2024-10-03 DIAGNOSIS — I87.2 VENOUS INSUFFICIENCY (CHRONIC) (PERIPHERAL): ICD-10-CM

## 2024-10-03 PROBLEM — I83.892 VARICOSE VEINS OF LEFT LOWER EXTREMITY WITH EDEMA: Status: ACTIVE | Noted: 2024-10-03

## 2024-10-03 PROBLEM — I83.812 VARICOSE VEINS OF LEFT LOWER EXTREMITY WITH PAIN: Status: ACTIVE | Noted: 2024-10-03

## 2024-10-03 PROCEDURE — 36475 ENDOVENOUS RF 1ST VEIN: CPT | Mod: LT

## 2024-10-03 NOTE — PROCEDURE
[FreeTextEntry1] : left gsv rfa [FreeTextEntry3] : left_lower extremity varicose veins with inflammation, fatigue, heaviness, pain, swelling, cramping heaviness, and dermatitis.  Pt was diagnosed with Chronic Venous insufficiency (CVI), chronic venous hypertension and venous reflux which requires thermal ablation with endovenous radiofrequency to treat the truncal/axial vein reflux (Radiofrequency Ablation, RFA).  Ultrasound examination demonstrated reflux in the left gsv vein with reflux into the patient's varicosities.  A trial of compression stockings, exercise, elevation, and pain medication was attempted without relief and as such, this patient was offered RFA as definitive treatment.  After explaining risks and benefits, informed consent was obtained and the patient was brought to the treatment room.  The patient was escorted to the procedure room and placed in the supine position on the examination table.   The left leg was prepped and draped in the usual sterile fashion and time-out was called.  A sonographic probe was placed into a sterile sheath and the lower extremity was imaged. A suitable access site in the superficial truncal vein to be treated was identified using sonographic guidance and marked at the skin level. 1 mL of 1% lidocaine without epinephrine was administered subcutaneously using a 25G needle.  The RF catheter, dilator and introducer were flushed with saline and wiped down and placed on the sterile field.  Using standard Seldinger technique, access to the saphenous vein was obtained with the needle and corresponding guidewire.  The needle was removed and the 7Fr introducer sheath with dilator was advanced over the wire into the vein. The guide wire and dilator were removed and the RFA fiber catheter was placed into the vein through the introducer sheath.  The position of the RFA  2 cm below the Sapheno-femoral Junction was verified using ultrasound guidance.       Local tumescent anesthesia, under ultrasound guidance, was administered circumferentially around the entire length of vein in the perivenous compartment to ensure a complete "halo" of anesthetic around the vein.  Tumescent anesthesia:  250 mL 0.9% Sodium Chloride was poured into a sterile blue basin and 50 cc of sterile injectable lidocaine 1% (without Epinephrine) was added using a 20 mL syringe.  The RFA location was again confirmed to be appropriate and the RFA energy was applied by pressing the hand button.  The proximal 7 cm was treated for 2 cycles at 20 seconds. The RFA fiber was then withdrawn in sequential 6.5 cm segments and each section was treated with one cycle of 20 seconds thermal energy application. The system was monitored to ensure that the vein wall temperature was within 120 +/- 5 degrees C and the generator output was well below its maximum starting power.   The operation of the RFA was ceased when the fiber end was at its most distal marker as indicated by the distal tip conrad lines.  The sheath and RFA fiber catheter were removed together and manual pressure was applied at access site for hemostasis.  Post procedure, the vein was imaged and showed successful closure along the entire treated length with a patent proximal most 2-3 cm and sapheno-femoral junction.  Dry bandaid was applied to the access site and a compression stocking 20 - 30 mm Hg was applied to the treated extremity.  The patient tolerated the procedure well with no complications.  Vital signs stable, patient was monitored throughout procedure.    Refer to procedure sheet for procedure start and end time, # of total cycles,  total treated vein length treated,  and total treatment duration time documentation.   Post-Op Instructions:  The following instructions were reviewed with the patient and a print out of the instructions provided to patient at time of visit:   1)  Compression stocking to be worn 24 hours per day x 7 days.  2)  Do not get the stocking wet for the first 3 days.  3)  Ambulation immediately following procedure and as much as possible for the first 7 days.  4)  Contact the office if any questions or concerns. 5)  Patient must follow-up within the first week for post procedure reflux study performed in office.  Reviewed with the patient the follow up visit is to ensure that there are no complications such as a deep vein thrombosis (DVT) or endovenous heat-induced thrombus (EHIT).  Patient acknowledged understanding of post-op instructions and was provided with print out of instructions at time of visit.    Reviewed with the patient post-procedure RFA  instructions, provided patient with printed copy of instructions along with Dr. Duval's cell phone number:  [phone], and advised patient to call for any questions or concerns prior to follow up visit.  All questions answered.

## 2024-10-03 NOTE — REASON FOR VISIT
[Procedure: _________] : a [unfilled] procedure visit [FreeTextEntry1] : Patient has severe reflux left gsv and here for left gsv RFA procedure.  YAMEL NICHOLS proceeded with left gsv RFA with no complications.  YAMEL NICHOLS will follow up in one week for routine visit with VLE.

## 2024-10-10 ENCOUNTER — APPOINTMENT (OUTPATIENT)
Dept: VASCULAR SURGERY | Facility: CLINIC | Age: 64
End: 2024-10-10
Payer: COMMERCIAL

## 2024-10-10 VITALS
OXYGEN SATURATION: 99 % | TEMPERATURE: 97.7 F | DIASTOLIC BLOOD PRESSURE: 83 MMHG | SYSTOLIC BLOOD PRESSURE: 137 MMHG | HEART RATE: 78 BPM

## 2024-10-10 DIAGNOSIS — I83.892 VARICOSE VEINS OF LEFT LOWER EXTREMITY WITH OTHER COMPLICATIONS: ICD-10-CM

## 2024-10-10 DIAGNOSIS — I83.812 VARICOSE VEINS OF LEFT LOWER EXTREMITY WITH PAIN: ICD-10-CM

## 2024-10-10 PROCEDURE — 99213 OFFICE O/P EST LOW 20 MIN: CPT | Mod: 25

## 2024-10-10 PROCEDURE — 93971 EXTREMITY STUDY: CPT | Mod: LT

## 2024-10-16 ENCOUNTER — APPOINTMENT (OUTPATIENT)
Dept: VASCULAR SURGERY | Facility: CLINIC | Age: 64
End: 2024-10-16
Payer: COMMERCIAL

## 2024-10-16 VITALS
SYSTOLIC BLOOD PRESSURE: 145 MMHG | TEMPERATURE: 97.7 F | OXYGEN SATURATION: 98 % | HEART RATE: 87 BPM | DIASTOLIC BLOOD PRESSURE: 89 MMHG

## 2024-10-16 DIAGNOSIS — I87.2 VENOUS INSUFFICIENCY (CHRONIC) (PERIPHERAL): ICD-10-CM

## 2024-10-16 PROCEDURE — 36475 ENDOVENOUS RF 1ST VEIN: CPT | Mod: RT

## 2024-10-24 ENCOUNTER — APPOINTMENT (OUTPATIENT)
Dept: VASCULAR SURGERY | Facility: CLINIC | Age: 64
End: 2024-10-24

## 2024-10-24 VITALS
SYSTOLIC BLOOD PRESSURE: 149 MMHG | HEART RATE: 97 BPM | OXYGEN SATURATION: 97 % | DIASTOLIC BLOOD PRESSURE: 89 MMHG | TEMPERATURE: 97 F

## 2024-10-24 DIAGNOSIS — I83.11 VARICOSE VEINS OF RIGHT LOWER EXTREMITY WITH INFLAMMATION: ICD-10-CM

## 2024-10-24 DIAGNOSIS — I83.891 VARICOSE VEINS OF RIGHT LOWER EXTREMITY WITH OTHER COMPLICATIONS: ICD-10-CM

## 2024-10-24 PROCEDURE — 99213 OFFICE O/P EST LOW 20 MIN: CPT | Mod: 25

## 2024-10-24 PROCEDURE — 93971 EXTREMITY STUDY: CPT | Mod: RT

## 2024-10-31 ENCOUNTER — APPOINTMENT (OUTPATIENT)
Dept: VASCULAR SURGERY | Facility: CLINIC | Age: 64
End: 2024-10-31
Payer: COMMERCIAL

## 2024-10-31 VITALS
TEMPERATURE: 98.4 F | HEART RATE: 90 BPM | SYSTOLIC BLOOD PRESSURE: 131 MMHG | OXYGEN SATURATION: 95 % | DIASTOLIC BLOOD PRESSURE: 83 MMHG

## 2024-10-31 DIAGNOSIS — I87.2 VENOUS INSUFFICIENCY (CHRONIC) (PERIPHERAL): ICD-10-CM

## 2024-10-31 PROCEDURE — 36475 ENDOVENOUS RF 1ST VEIN: CPT | Mod: LT

## 2024-11-07 ENCOUNTER — APPOINTMENT (OUTPATIENT)
Dept: VASCULAR SURGERY | Facility: CLINIC | Age: 64
End: 2024-11-07
Payer: COMMERCIAL

## 2024-11-07 VITALS
TEMPERATURE: 98 F | DIASTOLIC BLOOD PRESSURE: 81 MMHG | SYSTOLIC BLOOD PRESSURE: 133 MMHG | HEART RATE: 90 BPM | OXYGEN SATURATION: 98 %

## 2024-11-07 DIAGNOSIS — I83.812 VARICOSE VEINS OF LEFT LOWER EXTREMITY WITH PAIN: ICD-10-CM

## 2024-11-07 DIAGNOSIS — I83.892 VARICOSE VEINS OF LEFT LOWER EXTREMITY WITH OTHER COMPLICATIONS: ICD-10-CM

## 2024-11-07 PROCEDURE — 93971 EXTREMITY STUDY: CPT | Mod: LT

## 2024-11-07 PROCEDURE — 99213 OFFICE O/P EST LOW 20 MIN: CPT | Mod: 25

## 2024-11-15 ENCOUNTER — RX RENEWAL (OUTPATIENT)
Age: 64
End: 2024-11-15

## 2024-11-21 ENCOUNTER — APPOINTMENT (OUTPATIENT)
Dept: VASCULAR SURGERY | Facility: CLINIC | Age: 64
End: 2024-11-21
Payer: COMMERCIAL

## 2024-11-21 VITALS
HEART RATE: 95 BPM | SYSTOLIC BLOOD PRESSURE: 148 MMHG | OXYGEN SATURATION: 98 % | TEMPERATURE: 97.4 F | DIASTOLIC BLOOD PRESSURE: 85 MMHG

## 2024-11-21 DIAGNOSIS — I83.812 VARICOSE VEINS OF LEFT LOWER EXTREMITY WITH PAIN: ICD-10-CM

## 2024-11-21 DIAGNOSIS — I83.892 VARICOSE VEINS OF LEFT LOWER EXTREMITY WITH OTHER COMPLICATIONS: ICD-10-CM

## 2024-11-21 PROCEDURE — 37765 STAB PHLEB VEINS XTR 10-20: CPT | Mod: LT

## 2024-12-05 ENCOUNTER — APPOINTMENT (OUTPATIENT)
Dept: VASCULAR SURGERY | Facility: CLINIC | Age: 64
End: 2024-12-05
Payer: COMMERCIAL

## 2024-12-05 VITALS
HEART RATE: 83 BPM | DIASTOLIC BLOOD PRESSURE: 89 MMHG | TEMPERATURE: 97.8 F | OXYGEN SATURATION: 99 % | SYSTOLIC BLOOD PRESSURE: 141 MMHG

## 2024-12-05 DIAGNOSIS — I83.892 VARICOSE VEINS OF LEFT LOWER EXTREMITY WITH OTHER COMPLICATIONS: ICD-10-CM

## 2024-12-05 DIAGNOSIS — I83.812 VARICOSE VEINS OF LEFT LOWER EXTREMITY WITH PAIN: ICD-10-CM

## 2024-12-05 PROCEDURE — 99213 OFFICE O/P EST LOW 20 MIN: CPT

## 2024-12-16 ENCOUNTER — APPOINTMENT (OUTPATIENT)
Dept: VASCULAR SURGERY | Facility: CLINIC | Age: 64
End: 2024-12-16
Payer: COMMERCIAL

## 2024-12-16 VITALS
DIASTOLIC BLOOD PRESSURE: 91 MMHG | OXYGEN SATURATION: 96 % | SYSTOLIC BLOOD PRESSURE: 154 MMHG | HEART RATE: 77 BPM | TEMPERATURE: 98.4 F

## 2024-12-16 DIAGNOSIS — I83.11 VARICOSE VEINS OF RIGHT LOWER EXTREMITY WITH INFLAMMATION: ICD-10-CM

## 2024-12-16 DIAGNOSIS — I83.891 VARICOSE VEINS OF RIGHT LOWER EXTREMITY WITH OTHER COMPLICATIONS: ICD-10-CM

## 2024-12-16 PROCEDURE — 37765 STAB PHLEB VEINS XTR 10-20: CPT | Mod: RT

## 2024-12-26 LAB — CORE LAB BIOPSY: NORMAL

## 2024-12-30 ENCOUNTER — APPOINTMENT (OUTPATIENT)
Dept: VASCULAR SURGERY | Facility: CLINIC | Age: 64
End: 2024-12-30
Payer: COMMERCIAL

## 2024-12-30 VITALS
OXYGEN SATURATION: 99 % | SYSTOLIC BLOOD PRESSURE: 125 MMHG | DIASTOLIC BLOOD PRESSURE: 76 MMHG | TEMPERATURE: 97.2 F | HEART RATE: 70 BPM

## 2024-12-30 DIAGNOSIS — I83.891 VARICOSE VEINS OF RIGHT LOWER EXTREMITY WITH OTHER COMPLICATIONS: ICD-10-CM

## 2024-12-30 DIAGNOSIS — I83.11 VARICOSE VEINS OF RIGHT LOWER EXTREMITY WITH INFLAMMATION: ICD-10-CM

## 2024-12-30 PROCEDURE — 99213 OFFICE O/P EST LOW 20 MIN: CPT

## 2025-01-23 ENCOUNTER — APPOINTMENT (OUTPATIENT)
Dept: VASCULAR SURGERY | Facility: CLINIC | Age: 65
End: 2025-01-23
Payer: COMMERCIAL

## 2025-01-23 VITALS
OXYGEN SATURATION: 98 % | SYSTOLIC BLOOD PRESSURE: 150 MMHG | TEMPERATURE: 96.3 F | DIASTOLIC BLOOD PRESSURE: 87 MMHG | HEART RATE: 80 BPM

## 2025-01-23 DIAGNOSIS — I83.11 VARICOSE VEINS OF RIGHT LOWER EXTREMITY WITH INFLAMMATION: ICD-10-CM

## 2025-01-23 DIAGNOSIS — I83.891 VARICOSE VEINS OF RIGHT LOWER EXTREMITY WITH OTHER COMPLICATIONS: ICD-10-CM

## 2025-01-23 PROCEDURE — 36471 NJX SCLRSNT MLT INCMPTNT VN: CPT | Mod: RT

## 2025-01-27 ENCOUNTER — RX RENEWAL (OUTPATIENT)
Age: 65
End: 2025-01-27

## 2025-02-05 ENCOUNTER — APPOINTMENT (OUTPATIENT)
Dept: VASCULAR SURGERY | Facility: CLINIC | Age: 65
End: 2025-02-05
Payer: COMMERCIAL

## 2025-02-05 VITALS
HEART RATE: 80 BPM | OXYGEN SATURATION: 99 % | DIASTOLIC BLOOD PRESSURE: 83 MMHG | TEMPERATURE: 97.3 F | SYSTOLIC BLOOD PRESSURE: 137 MMHG

## 2025-02-05 DIAGNOSIS — I83.891 VARICOSE VEINS OF RIGHT LOWER EXTREMITY WITH OTHER COMPLICATIONS: ICD-10-CM

## 2025-02-05 DIAGNOSIS — I83.11 VARICOSE VEINS OF RIGHT LOWER EXTREMITY WITH INFLAMMATION: ICD-10-CM

## 2025-02-05 PROCEDURE — 36471 NJX SCLRSNT MLT INCMPTNT VN: CPT | Mod: RT

## 2025-02-20 ENCOUNTER — APPOINTMENT (OUTPATIENT)
Dept: VASCULAR SURGERY | Facility: CLINIC | Age: 65
End: 2025-02-20
Payer: COMMERCIAL

## 2025-02-20 VITALS
TEMPERATURE: 97.6 F | SYSTOLIC BLOOD PRESSURE: 133 MMHG | OXYGEN SATURATION: 98 % | DIASTOLIC BLOOD PRESSURE: 83 MMHG | HEART RATE: 87 BPM

## 2025-02-20 DIAGNOSIS — I83.891 VARICOSE VEINS OF RIGHT LOWER EXTREMITY WITH OTHER COMPLICATIONS: ICD-10-CM

## 2025-02-20 DIAGNOSIS — I83.11 VARICOSE VEINS OF RIGHT LOWER EXTREMITY WITH INFLAMMATION: ICD-10-CM

## 2025-02-20 PROCEDURE — 36471 NJX SCLRSNT MLT INCMPTNT VN: CPT | Mod: RT

## 2025-02-24 ENCOUNTER — RX RENEWAL (OUTPATIENT)
Age: 65
End: 2025-02-24

## 2025-03-06 ENCOUNTER — APPOINTMENT (OUTPATIENT)
Dept: VASCULAR SURGERY | Facility: CLINIC | Age: 65
End: 2025-03-06
Payer: COMMERCIAL

## 2025-03-06 VITALS
OXYGEN SATURATION: 98 % | DIASTOLIC BLOOD PRESSURE: 90 MMHG | TEMPERATURE: 97.3 F | HEART RATE: 87 BPM | SYSTOLIC BLOOD PRESSURE: 139 MMHG

## 2025-03-06 DIAGNOSIS — I83.812 VARICOSE VEINS OF LEFT LOWER EXTREMITY WITH PAIN: ICD-10-CM

## 2025-03-06 DIAGNOSIS — I83.892 VARICOSE VEINS OF LEFT LOWER EXTREMITY WITH OTHER COMPLICATIONS: ICD-10-CM

## 2025-03-06 PROCEDURE — 36471 NJX SCLRSNT MLT INCMPTNT VN: CPT | Mod: LT

## 2025-03-19 ENCOUNTER — APPOINTMENT (OUTPATIENT)
Dept: VASCULAR SURGERY | Facility: CLINIC | Age: 65
End: 2025-03-19
Payer: COMMERCIAL

## 2025-03-19 VITALS
HEART RATE: 66 BPM | TEMPERATURE: 97.3 F | DIASTOLIC BLOOD PRESSURE: 89 MMHG | OXYGEN SATURATION: 99 % | SYSTOLIC BLOOD PRESSURE: 147 MMHG

## 2025-03-19 DIAGNOSIS — I83.812 VARICOSE VEINS OF LEFT LOWER EXTREMITY WITH PAIN: ICD-10-CM

## 2025-03-19 DIAGNOSIS — I83.892 VARICOSE VEINS OF LEFT LOWER EXTREMITY WITH OTHER COMPLICATIONS: ICD-10-CM

## 2025-03-19 PROCEDURE — 36471 NJX SCLRSNT MLT INCMPTNT VN: CPT | Mod: LT

## 2025-03-26 ENCOUNTER — RX RENEWAL (OUTPATIENT)
Age: 65
End: 2025-03-26

## 2025-04-02 ENCOUNTER — APPOINTMENT (OUTPATIENT)
Dept: VASCULAR SURGERY | Facility: CLINIC | Age: 65
End: 2025-04-02
Payer: COMMERCIAL

## 2025-04-02 VITALS
OXYGEN SATURATION: 98 % | SYSTOLIC BLOOD PRESSURE: 145 MMHG | TEMPERATURE: 97.8 F | HEART RATE: 80 BPM | DIASTOLIC BLOOD PRESSURE: 76 MMHG

## 2025-04-02 DIAGNOSIS — I83.892 VARICOSE VEINS OF LEFT LOWER EXTREMITY WITH OTHER COMPLICATIONS: ICD-10-CM

## 2025-04-02 DIAGNOSIS — I83.812 VARICOSE VEINS OF LEFT LOWER EXTREMITY WITH PAIN: ICD-10-CM

## 2025-04-02 DIAGNOSIS — I87.2 VENOUS INSUFFICIENCY (CHRONIC) (PERIPHERAL): ICD-10-CM

## 2025-04-02 PROCEDURE — 36471 NJX SCLRSNT MLT INCMPTNT VN: CPT | Mod: LT

## 2025-04-23 ENCOUNTER — APPOINTMENT (OUTPATIENT)
Dept: VASCULAR SURGERY | Facility: CLINIC | Age: 65
End: 2025-04-23
Payer: COMMERCIAL

## 2025-04-23 VITALS
OXYGEN SATURATION: 100 % | SYSTOLIC BLOOD PRESSURE: 156 MMHG | HEART RATE: 85 BPM | DIASTOLIC BLOOD PRESSURE: 84 MMHG | TEMPERATURE: 97.6 F

## 2025-04-23 DIAGNOSIS — I83.812 VARICOSE VEINS OF LEFT LOWER EXTREMITY WITH PAIN: ICD-10-CM

## 2025-04-23 DIAGNOSIS — I83.892 VARICOSE VEINS OF LEFT LOWER EXTREMITY WITH OTHER COMPLICATIONS: ICD-10-CM

## 2025-04-23 PROCEDURE — 36471 NJX SCLRSNT MLT INCMPTNT VN: CPT | Mod: LT

## 2025-05-06 ENCOUNTER — APPOINTMENT (OUTPATIENT)
Dept: VASCULAR SURGERY | Facility: CLINIC | Age: 65
End: 2025-05-06
Payer: COMMERCIAL

## 2025-05-06 VITALS
OXYGEN SATURATION: 99 % | TEMPERATURE: 97.1 F | SYSTOLIC BLOOD PRESSURE: 133 MMHG | HEART RATE: 86 BPM | DIASTOLIC BLOOD PRESSURE: 82 MMHG

## 2025-05-06 DIAGNOSIS — I83.892 VARICOSE VEINS OF LEFT LOWER EXTREMITY WITH OTHER COMPLICATIONS: ICD-10-CM

## 2025-05-06 DIAGNOSIS — I83.812 VARICOSE VEINS OF LEFT LOWER EXTREMITY WITH PAIN: ICD-10-CM

## 2025-05-06 PROCEDURE — 36471 NJX SCLRSNT MLT INCMPTNT VN: CPT | Mod: LT

## 2025-05-20 ENCOUNTER — APPOINTMENT (OUTPATIENT)
Dept: VASCULAR SURGERY | Facility: CLINIC | Age: 65
End: 2025-05-20
Payer: COMMERCIAL

## 2025-05-20 VITALS
TEMPERATURE: 97.6 F | OXYGEN SATURATION: 96 % | SYSTOLIC BLOOD PRESSURE: 135 MMHG | DIASTOLIC BLOOD PRESSURE: 85 MMHG | HEART RATE: 87 BPM

## 2025-05-20 DIAGNOSIS — I83.812 VARICOSE VEINS OF LEFT LOWER EXTREMITY WITH PAIN: ICD-10-CM

## 2025-05-20 DIAGNOSIS — I83.892 VARICOSE VEINS OF LEFT LOWER EXTREMITY WITH OTHER COMPLICATIONS: ICD-10-CM

## 2025-05-20 PROCEDURE — 36471 NJX SCLRSNT MLT INCMPTNT VN: CPT | Mod: LT

## 2025-06-03 ENCOUNTER — APPOINTMENT (OUTPATIENT)
Dept: VASCULAR SURGERY | Facility: CLINIC | Age: 65
End: 2025-06-03
Payer: COMMERCIAL

## 2025-06-03 VITALS
HEART RATE: 83 BPM | DIASTOLIC BLOOD PRESSURE: 85 MMHG | TEMPERATURE: 98.6 F | OXYGEN SATURATION: 97 % | SYSTOLIC BLOOD PRESSURE: 155 MMHG

## 2025-06-03 DIAGNOSIS — I83.891 VARICOSE VEINS OF RIGHT LOWER EXTREMITY WITH OTHER COMPLICATIONS: ICD-10-CM

## 2025-06-03 DIAGNOSIS — I83.11 VARICOSE VEINS OF RIGHT LOWER EXTREMITY WITH INFLAMMATION: ICD-10-CM

## 2025-06-03 PROCEDURE — 36471 NJX SCLRSNT MLT INCMPTNT VN: CPT | Mod: RT

## 2025-06-10 RX ORDER — AZITHROMYCIN 250 MG/1
250 TABLET, FILM COATED ORAL
Qty: 1 | Refills: 0 | Status: ACTIVE | COMMUNITY
Start: 2025-06-10 | End: 1900-01-01

## 2025-06-16 ENCOUNTER — APPOINTMENT (OUTPATIENT)
Dept: VASCULAR SURGERY | Facility: CLINIC | Age: 65
End: 2025-06-16
Payer: COMMERCIAL

## 2025-06-16 VITALS
DIASTOLIC BLOOD PRESSURE: 93 MMHG | OXYGEN SATURATION: 97 % | SYSTOLIC BLOOD PRESSURE: 156 MMHG | HEART RATE: 82 BPM | TEMPERATURE: 98.3 F

## 2025-06-16 PROCEDURE — 36471 NJX SCLRSNT MLT INCMPTNT VN: CPT | Mod: RT

## 2025-08-26 PROBLEM — R29.898 LEG HEAVINESS: Status: RESOLVED | Noted: 2024-09-05 | Resolved: 2025-08-26

## 2025-08-26 PROBLEM — Z87.19 HISTORY OF HIATAL HERNIA: Status: RESOLVED | Noted: 2023-05-24 | Resolved: 2025-08-26

## 2025-08-26 PROBLEM — Z87.2 HISTORY OF DERMATITIS: Status: RESOLVED | Noted: 2024-09-05 | Resolved: 2025-08-26

## 2025-08-26 PROBLEM — I83.11 VARICOSE VEINS OF RIGHT LOWER EXTREMITY WITH INFLAMMATION: Status: RESOLVED | Noted: 2024-09-19 | Resolved: 2025-08-26

## 2025-08-26 PROBLEM — M79.89 LEG SWELLING: Status: RESOLVED | Noted: 2024-09-05 | Resolved: 2025-08-26

## 2025-08-26 PROBLEM — I83.892 VARICOSE VEINS OF LEFT LOWER EXTREMITY WITH EDEMA: Status: RESOLVED | Noted: 2024-10-03 | Resolved: 2025-08-26

## 2025-08-26 PROBLEM — I83.813 VARICOSE VEINS OF BOTH LOWER EXTREMITIES WITH PAIN: Status: RESOLVED | Noted: 2024-09-05 | Resolved: 2025-08-26

## 2025-08-26 PROBLEM — I83.891 VARICOSE VEINS OF LEG WITH SWELLING, RIGHT: Status: RESOLVED | Noted: 2025-02-20 | Resolved: 2025-08-26

## 2025-08-26 PROBLEM — Z71.84 TRAVEL ADVICE ENCOUNTER: Status: RESOLVED | Noted: 2025-06-10 | Resolved: 2025-08-26

## 2025-08-26 PROBLEM — R10.30 LOWER ABDOMINAL PAIN: Status: RESOLVED | Noted: 2019-05-13 | Resolved: 2025-08-26

## 2025-08-26 PROBLEM — S80.02XA CONTUSION OF LEFT KNEE, INITIAL ENCOUNTER: Status: RESOLVED | Noted: 2018-02-27 | Resolved: 2025-08-26

## 2025-08-26 PROBLEM — I83.93 VARICOSE VEINS OF LEGS: Status: RESOLVED | Noted: 2024-06-27 | Resolved: 2025-08-26

## 2025-08-26 PROBLEM — I83.893 VARICOSE VEINS OF BILATERAL LOWER EXTREMITIES WITH OTHER COMPLICATIONS: Status: RESOLVED | Noted: 2024-09-05 | Resolved: 2025-08-26

## 2025-08-26 PROBLEM — S80.01XA CONTUSION OF RIGHT KNEE, INITIAL ENCOUNTER: Status: RESOLVED | Noted: 2018-02-27 | Resolved: 2025-08-26

## 2025-08-26 PROBLEM — I83.812 VARICOSE VEINS OF LEFT LOWER EXTREMITY WITH PAIN: Status: RESOLVED | Noted: 2024-10-03 | Resolved: 2025-08-26

## 2025-08-27 ENCOUNTER — LABORATORY RESULT (OUTPATIENT)
Age: 65
End: 2025-08-27

## 2025-08-27 ENCOUNTER — NON-APPOINTMENT (OUTPATIENT)
Age: 65
End: 2025-08-27

## 2025-08-27 ENCOUNTER — APPOINTMENT (OUTPATIENT)
Dept: INTERNAL MEDICINE | Facility: CLINIC | Age: 65
End: 2025-08-27
Payer: COMMERCIAL

## 2025-08-27 VITALS
OXYGEN SATURATION: 97 % | HEIGHT: 60 IN | SYSTOLIC BLOOD PRESSURE: 125 MMHG | DIASTOLIC BLOOD PRESSURE: 79 MMHG | WEIGHT: 176 LBS | HEART RATE: 85 BPM | TEMPERATURE: 97.9 F | BODY MASS INDEX: 34.55 KG/M2

## 2025-08-27 DIAGNOSIS — H53.9 UNSPECIFIED VISUAL DISTURBANCE: ICD-10-CM

## 2025-08-27 DIAGNOSIS — Z00.00 ENCOUNTER FOR GENERAL ADULT MEDICAL EXAMINATION W/OUT ABNORMAL FINDINGS: ICD-10-CM

## 2025-08-27 DIAGNOSIS — I83.893 VARICOSE VEINS OF BILATERAL LOWER EXTREMITIES WITH OTHER COMPLICATIONS: ICD-10-CM

## 2025-08-27 DIAGNOSIS — D70.9 NEUTROPENIA, UNSPECIFIED: ICD-10-CM

## 2025-08-27 DIAGNOSIS — S80.01XA CONTUSION OF RIGHT KNEE, INITIAL ENCOUNTER: ICD-10-CM

## 2025-08-27 DIAGNOSIS — M25.562 PAIN IN RIGHT KNEE: ICD-10-CM

## 2025-08-27 DIAGNOSIS — E78.5 HYPERLIPIDEMIA, UNSPECIFIED: ICD-10-CM

## 2025-08-27 DIAGNOSIS — I83.892 VARICOSE VEINS OF LEFT LOWER EXTREMITY WITH OTHER COMPLICATIONS: ICD-10-CM

## 2025-08-27 DIAGNOSIS — M25.512 PAIN IN LEFT SHOULDER: ICD-10-CM

## 2025-08-27 DIAGNOSIS — R29.898 OTHER SYMPTOMS AND SIGNS INVOLVING THE MUSCULOSKELETAL SYSTEM: ICD-10-CM

## 2025-08-27 DIAGNOSIS — I83.813 VARICOSE VEINS OF BILATERAL LOWER EXTREMITIES WITH PAIN: ICD-10-CM

## 2025-08-27 DIAGNOSIS — M25.569 PAIN IN UNSPECIFIED KNEE: ICD-10-CM

## 2025-08-27 DIAGNOSIS — I83.812 VARICOSE VEINS OF LEFT LOWER EXTREMITY WITH PAIN: ICD-10-CM

## 2025-08-27 DIAGNOSIS — I83.93 ASYMPTOMATIC VARICOSE VEINS OF BILATERAL LOWER EXTREMITIES: ICD-10-CM

## 2025-08-27 DIAGNOSIS — R73.03 PREDIABETES.: ICD-10-CM

## 2025-08-27 DIAGNOSIS — I83.891 VARICOSE VEINS OF RIGHT LOWER EXTREMITY WITH OTHER COMPLICATIONS: ICD-10-CM

## 2025-08-27 DIAGNOSIS — Z87.42 PERSONAL HISTORY OF OTHER DISEASES OF THE FEMALE GENITAL TRACT: ICD-10-CM

## 2025-08-27 DIAGNOSIS — R10.30 LOWER ABDOMINAL PAIN, UNSPECIFIED: ICD-10-CM

## 2025-08-27 DIAGNOSIS — Z87.09 PERSONAL HISTORY OF OTHER DISEASES OF THE RESPIRATORY SYSTEM: ICD-10-CM

## 2025-08-27 DIAGNOSIS — G89.29 PAIN IN UNSPECIFIED KNEE: ICD-10-CM

## 2025-08-27 DIAGNOSIS — Z71.84 ENC FOR HEALTH COUNSELING RELATED TO TRAVEL: ICD-10-CM

## 2025-08-27 DIAGNOSIS — Z86.2 PERSONAL HISTORY OF DISEASES OF THE BLOOD AND BLOOD-FORMING ORGANS AND CERTAIN DISORDERS INVOLVING THE IMMUNE MECHANISM: ICD-10-CM

## 2025-08-27 DIAGNOSIS — M19.049 PRIMARY OSTEOARTHRITIS, UNSPECIFIED HAND: ICD-10-CM

## 2025-08-27 DIAGNOSIS — I83.11 VARICOSE VEINS OF RIGHT LOWER EXTREMITY WITH INFLAMMATION: ICD-10-CM

## 2025-08-27 DIAGNOSIS — S80.02XA CONTUSION OF LEFT KNEE, INITIAL ENCOUNTER: ICD-10-CM

## 2025-08-27 DIAGNOSIS — Z87.2 PERSONAL HISTORY OF DISEASES OF THE SKIN AND SUBCUTANEOUS TISSUE: ICD-10-CM

## 2025-08-27 DIAGNOSIS — M25.561 PAIN IN RIGHT KNEE: ICD-10-CM

## 2025-08-27 DIAGNOSIS — I10 ESSENTIAL (PRIMARY) HYPERTENSION: ICD-10-CM

## 2025-08-27 DIAGNOSIS — M79.89 OTHER SPECIFIED SOFT TISSUE DISORDERS: ICD-10-CM

## 2025-08-27 DIAGNOSIS — Z87.19 PERSONAL HISTORY OF OTHER DISEASES OF THE DIGESTIVE SYSTEM: ICD-10-CM

## 2025-08-27 PROCEDURE — 99396 PREV VISIT EST AGE 40-64: CPT

## 2025-08-27 PROCEDURE — 99214 OFFICE O/P EST MOD 30 MIN: CPT | Mod: 25

## 2025-08-27 PROCEDURE — 36415 COLL VENOUS BLD VENIPUNCTURE: CPT

## 2025-08-27 RX ORDER — AMOXICILLIN AND CLAVULANATE POTASSIUM 500; 125 MG/1; MG/1
500-125 TABLET, FILM COATED ORAL
Qty: 14 | Refills: 0 | Status: ACTIVE | COMMUNITY
Start: 2025-08-27 | End: 1900-01-01

## 2025-08-28 ENCOUNTER — TRANSCRIPTION ENCOUNTER (OUTPATIENT)
Age: 65
End: 2025-08-28

## 2025-08-28 LAB
ALBUMIN SERPL ELPH-MCNC: 4.3 G/DL
ALP BLD-CCNC: 111 U/L
ALT SERPL-CCNC: 29 U/L
ANION GAP SERPL CALC-SCNC: 14 MMOL/L
AST SERPL-CCNC: 24 U/L
BASOPHILS # BLD AUTO: 0 K/UL
BASOPHILS NFR BLD AUTO: 0 %
BILIRUB SERPL-MCNC: 0.3 MG/DL
BUN SERPL-MCNC: 8 MG/DL
CALCIUM SERPL-MCNC: 9.4 MG/DL
CHLORIDE SERPL-SCNC: 102 MMOL/L
CHOLEST SERPL-MCNC: 177 MG/DL
CO2 SERPL-SCNC: 26 MMOL/L
CREAT SERPL-MCNC: 0.55 MG/DL
EGFRCR SERPLBLD CKD-EPI 2021: 103 ML/MIN/1.73M2
EOSINOPHIL # BLD AUTO: 0.09 K/UL
EOSINOPHIL NFR BLD AUTO: 3.6 %
ESTIMATED AVERAGE GLUCOSE: 103 MG/DL
GLUCOSE SERPL-MCNC: 91 MG/DL
HBA1C MFR BLD HPLC: 5.2 %
HCT VFR BLD CALC: 44.9 %
HDLC SERPL-MCNC: 49 MG/DL
HGB BLD-MCNC: 13.9 G/DL
LDLC SERPL-MCNC: 108 MG/DL
LYMPHOCYTES # BLD AUTO: 0.89 K/UL
LYMPHOCYTES NFR BLD AUTO: 34.2 %
MAN DIFF?: NORMAL
MCHC RBC-ENTMCNC: 28.4 PG
MCHC RBC-ENTMCNC: 31 G/DL
MCV RBC AUTO: 91.6 FL
MONOCYTES # BLD AUTO: 0.26 K/UL
MONOCYTES NFR BLD AUTO: 9.9 %
NEUTROPHILS # BLD AUTO: 1.21 K/UL
NEUTROPHILS NFR BLD AUTO: 46 %
NONHDLC SERPL-MCNC: 129 MG/DL
PLATELET # BLD AUTO: 340 K/UL
POTASSIUM SERPL-SCNC: 4.8 MMOL/L
PROT SERPL-MCNC: 7.9 G/DL
RBC # BLD: 4.9 M/UL
RBC # FLD: 14.4 %
SODIUM SERPL-SCNC: 142 MMOL/L
TRIGL SERPL-MCNC: 112 MG/DL
TSH SERPL-ACNC: 2.7 UIU/ML
WBC # FLD AUTO: 2.59 K/UL

## 2025-09-19 ENCOUNTER — APPOINTMENT (OUTPATIENT)
Dept: MAMMOGRAPHY | Facility: IMAGING CENTER | Age: 65
End: 2025-09-19
Payer: COMMERCIAL

## 2025-09-19 ENCOUNTER — RESULT REVIEW (OUTPATIENT)
Age: 65
End: 2025-09-19

## 2025-09-19 PROCEDURE — 77063 BREAST TOMOSYNTHESIS BI: CPT | Mod: 26

## 2025-09-19 PROCEDURE — 77067 SCR MAMMO BI INCL CAD: CPT | Mod: 26
